# Patient Record
Sex: FEMALE | Race: WHITE | NOT HISPANIC OR LATINO | Employment: FULL TIME | URBAN - METROPOLITAN AREA
[De-identification: names, ages, dates, MRNs, and addresses within clinical notes are randomized per-mention and may not be internally consistent; named-entity substitution may affect disease eponyms.]

---

## 2023-04-04 LAB
ABO GROUP (TYPE) IN BLOOD: NORMAL
ANTIBODY SCREEN: NORMAL
CHLAMYDIA TRACH., AMPLIFIED: NORMAL
HEPATITIS B VIRUS SURFACE AG PRESENCE IN SERUM: NORMAL
HEPATITIS C VIRUS AB PRESENCE IN SERUM: NORMAL
HIV 1/ 2 AG/AB SCREEN: NORMAL
N. GONORRHEA, AMPLIFIED: NORMAL
RH FACTOR: NORMAL
RUBELLA VIRUS IGG AB: NORMAL
SYPHILIS TOTAL AB: NORMAL
VARICELLA ZOSTER IGG: NORMAL

## 2023-04-05 LAB
CHLAMYDIA TRACH., AMPLIFIED: NEGATIVE
N. GONORRHEA, AMPLIFIED: NEGATIVE

## 2023-10-03 ENCOUNTER — ANCILLARY PROCEDURE (OUTPATIENT)
Dept: ENDOCRINOLOGY | Facility: CLINIC | Age: 38
End: 2023-10-03
Payer: COMMERCIAL

## 2023-10-03 ENCOUNTER — LAB (OUTPATIENT)
Dept: LAB | Facility: LAB | Age: 38
End: 2023-10-03
Payer: COMMERCIAL

## 2023-10-03 DIAGNOSIS — Z11.59 ENCOUNTER FOR SCREENING FOR OTHER VIRAL DISEASES: Primary | ICD-10-CM

## 2023-10-03 DIAGNOSIS — Z01.83 ENCOUNTER FOR BLOOD TYPING: ICD-10-CM

## 2023-10-03 DIAGNOSIS — Z11.3 ENCOUNTER FOR SCREENING FOR INFECTIONS WITH A PREDOMINANTLY SEXUAL MODE OF TRANSMISSION: ICD-10-CM

## 2023-10-03 DIAGNOSIS — N83.8 OTHER NONINFLAMMATORY DISORDERS OF OVARY, FALLOPIAN TUBE AND BROAD LIGAMENT: ICD-10-CM

## 2023-10-03 LAB
ABO GROUP (TYPE) IN BLOOD: NORMAL
ANTIBODY SCREEN: NORMAL
RH FACTOR (ANTIGEN D): NORMAL

## 2023-10-03 PROCEDURE — 76830 TRANSVAGINAL US NON-OB: CPT

## 2023-10-03 PROCEDURE — 76856 US EXAM PELVIC COMPLETE: CPT | Performed by: OBSTETRICS & GYNECOLOGY

## 2023-10-03 PROCEDURE — 36415 COLL VENOUS BLD VENIPUNCTURE: CPT

## 2023-10-03 PROCEDURE — 76830 TRANSVAGINAL US NON-OB: CPT | Performed by: OBSTETRICS & GYNECOLOGY

## 2023-10-04 PROBLEM — N83.209 SIMPLE OVARIAN CYST: Status: ACTIVE | Noted: 2023-10-04

## 2023-10-04 PROBLEM — M62.838 MUSCLE SPASM: Status: ACTIVE | Noted: 2021-08-27

## 2023-10-04 PROBLEM — N92.0 MENORRHAGIA WITH REGULAR CYCLE: Status: ACTIVE | Noted: 2023-10-04

## 2023-10-04 PROBLEM — N70.11 CHRONIC SALPINGITIS: Status: ACTIVE | Noted: 2023-10-04

## 2023-10-04 PROBLEM — N83.8 PARATUBAL CYST: Status: ACTIVE | Noted: 2023-10-04

## 2023-10-04 PROBLEM — N97.9 FEMALE FERTILITY PROBLEM: Status: ACTIVE | Noted: 2023-10-04

## 2023-10-04 PROBLEM — E55.9 VITAMIN D DEFICIENCY: Status: ACTIVE | Noted: 2023-10-04

## 2023-10-04 LAB
HBV SURFACE AG SERPL QL IA: NONREACTIVE
HCV AB SER QL: NONREACTIVE
RUBV IGG SERPL IA-ACNC: 1.1 IA
RUBV IGG SERPL QL IA: POSITIVE
T PALLIDUM AB SER QL: NONREACTIVE
VARICELLA ZOSTER IGG INDEX: 0.9 IA
VZV IGG SER QL IA: ABNORMAL

## 2023-10-04 RX ORDER — ASPIRIN 325 MG
1 TABLET, DELAYED RELEASE (ENTERIC COATED) ORAL
COMMUNITY
Start: 2022-04-25 | End: 2023-10-09 | Stop reason: ALTCHOICE

## 2023-10-04 RX ORDER — AZITHROMYCIN 250 MG/1
TABLET, FILM COATED ORAL
COMMUNITY
Start: 2023-06-17 | End: 2023-10-09 | Stop reason: ALTCHOICE

## 2023-10-04 RX ORDER — B-COMPLEX WITH VITAMIN C
1 TABLET ORAL DAILY
COMMUNITY
Start: 2022-04-22 | End: 2023-10-09 | Stop reason: ALTCHOICE

## 2023-10-05 ENCOUNTER — TELEMEDICINE (OUTPATIENT)
Dept: BEHAVIORAL HEALTH | Facility: CLINIC | Age: 38
End: 2023-10-05
Payer: COMMERCIAL

## 2023-10-05 ENCOUNTER — TELEPHONE (OUTPATIENT)
Dept: ENDOCRINOLOGY | Facility: CLINIC | Age: 38
End: 2023-10-05

## 2023-10-05 DIAGNOSIS — Z31.69 INFERTILITY COUNSELING: ICD-10-CM

## 2023-10-05 LAB — HIV 1+2 AB+HIV1 P24 AG SERPL QL IA: NONREACTIVE

## 2023-10-05 PROCEDURE — 90791 PSYCH DIAGNOSTIC EVALUATION: CPT | Mod: 95 | Performed by: PSYCHOLOGIST

## 2023-10-05 PROCEDURE — 90791 PSYCH DIAGNOSTIC EVALUATION: CPT | Performed by: PSYCHOLOGIST

## 2023-10-05 NOTE — PROGRESS NOTES
Start time 11 am  End time 11:45 am    Sony Joy MRN [id number] and Ashok Charles    I met with Sony Joy and Ashok Charles who are requesting IUI with the intention to co-parent.    Relevant History  Sony, 38, is a single woman who has not had any longterm relationships and is content as single but would like to co-parent a child with her friend, Ashok Charles.  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  She and Ashok have been talking about co-parenting a child with her being inseminated with his sperm for 2-3 years and have tried on their own for 1 year with no pregnancy.    Ashok is a  at [employer] and is on the tenure track. He is also from [place] and became friends with Sony in high school and moved back to [place] 5 years ago.  Ashok recently  this summer and he and his  plan to adopt but the decision to co-parent (separately from having a child with his own ) was made before he met his .  Ashok says his  is very supportive of this plan.    Sony and Ashok state that while the intention is to co-parent, the plan that they have developed for themselves is that Sony will be the primary parent with primary residence.  It is unclear to me if they have any legal documentation about this plan. I explained that there is an ethical dilemma with a potential negative impact for offspring in this parenting situation with the expectation of a child having to live in 2 homes and the other potential complications that may arise.    We discussed the option of Sony having IUI as a single woman and Ashok would consent to be the identified sperm donor (along with relinquishing paternity rights) and suggested they discuss this and consult with a reproductive health . I also reviewed children's books that explain the concept of different constellations of families.   If they move forward with Ashok as an  identified donor then it is my opinion that Sony is an appropriate candidate for IUI using identified donor sperm.  If they do not choose this option they are aware that the fertility program will discuss their request at their next ethics meeting.    Calista Peres, PhD

## 2023-10-05 NOTE — TELEPHONE ENCOUNTER
"Returned patient's call, she states had appt with Dr Peres today and was \"blind sided\" as Dr. Peres states she will have complete additional clearance items prior to being allowed to proceed with fertility treatment. Advised patient I will forward her concerns to DE Zambrano NP for discussion at her fuv next week.  Reviewed and approved by LUCY PHELAN on 10/5/23 at 2:59 PM./  "

## 2023-10-05 NOTE — LETTER
October 5, 2023     Yvonne Lagos, KATHIA-CNP  [address]  [place] [id number]    Patient: Sony Joy   YOB: 1985   Date of Visit: 10/5/2023       Dear Dr. Yvonne Lagos, APRN-CNP:    Thank you for referring Sony Joy to me for evaluation. Below are my notes for this consultation.  If you have questions, please do not hesitate to call me. I look forward to following your patient along with you.       Sincerely,     Calista Peres, PhD      CC: Hellen Zambrano, APRN-MD Dorothy Claros MD Erika L Kelley, PhD  MD Dorothy Pappas, RN  ______________________________________________________________________________________    Sony Joy MRN [id number] and Ashok Charles    I met with Sony Joy and Ashok Charles who are requesting IUI with the intention to co-parent.    Relevant History  Sony, 38, is a single woman who has not had any longterm relationships and is content as single but would like to co-parent a child with her friend, Ashok Trujilloover.  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  She and Ashok have been talking about co-parenting a child with her being inseminated with his sperm for 2-3 years and have tried on their own for 1 year with no pregnancy.    Ashok is a  at [employer] and is on the tenure track. He is also from [place] and became friends with Sony in high school and moved back to [place] 5 years ago.  Ashok recently  this summer and he and his  plan to adopt but the decision to co-parent (separately from having a child with his own ) was made before he met his .  Ashok says his  is very supportive of this plan.    Sony and Ashok state that while the intention is to co-parent, the plan that they have developed for themselves is that Sony will be the primary parent with primary residence.  It is unclear to me if they have  any legal documentation about this plan. I explained that there is an ethical dilemma with a potential negative impact for offspring in this parenting situation with the expectation of a child having to live in 2 homes and the other potential complications that may arise.    We discussed the option of Sony having IUI as a single woman and Ashok would consent to be the identified sperm donor (along with relinquishing paternity rights) and suggested they discuss this and consult with a reproductive health . I also reviewed children's books that explain the concept of different constellations of families.   If they move forward with Ashok as an identified donor then it is my opinion that Sony is an appropriate candidate for IUI using identified donor sperm.  If they do not choose this option they are aware that the fertility program will discuss their request at their next ethics meeting.    Calista Peres, PhD

## 2023-10-09 ENCOUNTER — TELEMEDICINE (OUTPATIENT)
Dept: ENDOCRINOLOGY | Facility: CLINIC | Age: 38
End: 2023-10-09
Payer: COMMERCIAL

## 2023-10-09 DIAGNOSIS — N76.0 ACUTE VAGINITIS: Primary | ICD-10-CM

## 2023-10-09 DIAGNOSIS — Z31.89 ENCOUNTER FOR FERTILITY PLANNING: Primary | ICD-10-CM

## 2023-10-09 PROCEDURE — 99213 OFFICE O/P EST LOW 20 MIN: CPT | Mod: GT

## 2023-10-09 PROCEDURE — 99213 OFFICE O/P EST LOW 20 MIN: CPT

## 2023-10-09 RX ORDER — DOXYCYCLINE 100 MG/1
100 CAPSULE ORAL 2 TIMES DAILY
Qty: 14 CAPSULE | Refills: 0 | Status: SHIPPED | OUTPATIENT
Start: 2023-10-09 | End: 2023-10-16

## 2023-10-09 NOTE — PROGRESS NOTES
Follow Up Visit HPI  Patient is a 37 year old  female who presents for a FUV to discuss test results and next steps, with h/o primary infertility & 8-9 unsuccessful at home IUIs with a high school friend for the past year, recently met with Dr. Peres to discuss next steps. Identifies as female, Partner identifies as homosexual male.  Patient is not in relationship, she is attempting pregnancy with a high school friend, they are using artificial insemination at home and not having an intercourse- about 8-9 at home IUIs  Patient and partner are interested in co-parenting.  Friend/partner recently  summer 2023 and plans to adopt child with his .   Dr. Peres discussed option of partner being identified as known sperm donor with legal contracts.  Patient and partner unsure of how they will proceed.    Testing to date:   Type and Screen: A+  Rubella:  10/3/2023- immune  Varicella:  10/3/2023- equivocal- declines vaccine  TSH: 2.12  AMH: 4.79  STDs negative  Myriad declined  Hysterosalpingogram: normal 2023  GYN Pelvic Ultrasound: US PELVIS TRANSVAGINAL (10/3/2023):  repeat S on 10-3-23: left paraovarian simple cyst unchanged at 11.5 mm and no need to repeat US further per DR. 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   Periods are somewhat regular, on average cycles are 29, 5-7 days heavy, two heavy days, uses menstrual  cups for the heaviest days, had to change the cup twice to three times, during those days,(if uses super tampons has to change them every 45 min). The capacity of the cup is 30ml. Base body temperature indicated ovulation and lasts about 11 days on average. moderate to severe cramps x 1 day per period.    Past Medical History:   Diagnosis Date    Encounter for gynecological examination (general) (routine) without abnormal findings 2022    Well woman exam    Encounter for screening for malignant neoplasm of cervix 2022    Cervical cancer screening    Other conditions influencing health status 2022    Ambivalent attitude about trying to conceive    Vitamin D deficiency, unspecified 2022    Vitamin D deficiency     No past surgical history on file.  Current Outpatient Medications on File Prior to Visit   Medication Sig Dispense Refill    azithromycin (Zithromax) 250 mg tablet Use as directed per package instructions      cholecalciferol (Vitamin D-3) 1,250 mcg (50,000 unit) capsule Take 1 capsule (50,000 Units) by mouth 1 (one) time per week.      norethindrone-e.estradioL-iron (Loestrin 24 FE) 1 mg-20 mcg (24)/75 mg (4) tablet Take by mouth. As directed.      prenatal vitamin, iron-folic, (Prenatal Vitamin) 27 mg iron-800 mcg folic acid tablet Take 1 tablet by mouth once daily.       No current facility-administered medications on file prior to visit.       BMI:   BMI Readings from Last 1 Encounters:   23 25.58 kg/m²     VITALS:  There were no vitals taken for this visit.  LMP: No LMP recorded.    ASSESSMENT   38 y.o. female  with h/o male factor infertility, infertility x 1 years, s/p 8-9 unsuccessful at home IUIs with a high school friend (Ashok) for the past year, recently met with Dr. Peres for psychological assessment to discuss next steps in Donor IUI process. Identifies as female, partner identifies as homosexual male. Patient and partner are interested in co-parenting,  friend/partner recently  and plans to adopt child. Dr. Peres discussed option of partner being identified as known sperm donor vs to go to ethics if they do not choose this option.      FOLLOW UP PLAN  -if chooses to co-parent, then Ethics must decide if this is a viable option- next meeting 11-1-2023  -will need legal contracts regardless of option chosen- both must have separate   -if approved by Ethics, then DR. Peres will need to meet with Ashok's   -if approved by Ethics, Ashok will need Viromed STDs/FDA Physical/ FDA questionnaire/SA- will need to meet with GABBY Lagos CNP (CNP Coordinator for Third Party Reproduction) to establish care- should schedule a visit for after the next ethics meeting so as not to delay care      10/09/23 at 6:17 PM - KATHIA Brady-EMMY

## 2023-10-10 ENCOUNTER — APPOINTMENT (OUTPATIENT)
Dept: ENDOCRINOLOGY | Facility: CLINIC | Age: 38
End: 2023-10-10
Payer: COMMERCIAL

## 2023-11-03 ENCOUNTER — TELEPHONE (OUTPATIENT)
Dept: ENDOCRINOLOGY | Facility: CLINIC | Age: 38
End: 2023-11-03
Payer: COMMERCIAL

## 2023-11-03 NOTE — TELEPHONE ENCOUNTER
My Chart message sent to patient indicating that Dr. Martinez will be following up with her after the ethics meeting and to anticipate a call in the next week or so.  Molina Tiwari RN

## 2023-11-08 ENCOUNTER — TELEPHONE (OUTPATIENT)
Dept: ENDOCRINOLOGY | Facility: CLINIC | Age: 38
End: 2023-11-08
Payer: COMMERCIAL

## 2023-11-08 ENCOUNTER — TELEPHONE (OUTPATIENT)
Dept: ENDOCRINOLOGY | Facility: CLINIC | Age: 38
End: 2023-11-08

## 2023-11-08 NOTE — TELEPHONE ENCOUNTER
Pt requesting you call her today, she said she has been playing phone tag and today would be a good day to be able to speak with you.

## 2023-11-08 NOTE — TELEPHONE ENCOUNTER
Returned call to patient. Did explain that Yvonne Lagos is out of the office rest of this week. Did explain that she would like to see her for a virtual follow up visit to discuss next steps and get all of her questions answered regarding the process of using a known sperm donor.   Explained that Yvonne did have an appointment with known donor this week and explained next steps. Sony did express that he still has questions and may be some confusion regarding next steps. Told patient I will talk to Yvonne and see if we can get her in for a follow up as soon as possible to discuss more and then will provide a detailed list of exactly what is needed to proceed with this process. Patient agreeable. Will contact once we have a date for visit.    11/08/23 at 4:00 PM - Dorothy Barrera RN

## 2023-11-13 ENCOUNTER — TELEMEDICINE (OUTPATIENT)
Dept: ENDOCRINOLOGY | Facility: CLINIC | Age: 38
End: 2023-11-13
Payer: COMMERCIAL

## 2023-11-13 VITALS — BODY MASS INDEX: 24.55 KG/M2 | HEIGHT: 61 IN | WEIGHT: 130 LBS

## 2023-11-13 DIAGNOSIS — Z13.29 SCREENING FOR THYROID DISORDER: ICD-10-CM

## 2023-11-13 DIAGNOSIS — Z11.59 SCREENING FOR VIRAL DISEASE: Primary | ICD-10-CM

## 2023-11-13 DIAGNOSIS — Z31.89 ENCOUNTER FOR FERTILITY PLANNING: ICD-10-CM

## 2023-11-13 PROCEDURE — 99214 OFFICE O/P EST MOD 30 MIN: CPT | Performed by: NURSE PRACTITIONER

## 2023-11-13 ASSESSMENT — PAIN SCALES - GENERAL: PAINLEVEL: 0-NO PAIN

## 2023-11-13 NOTE — PROGRESS NOTES
Virtual Visit    Follow Up Visit HPI    Patient is a 38 y.o.  female with Unexplained Infertility presenting today for follow up visit.     Testing to date:   Result Date Done   Type and Screen: A 10/3/2023   Rh: POS 10/3/2023   Antibody: NEG (Ref range: )  No results found for requested labs within last 365 days.   Rubella: Positive (A; Ref range: Negative) 10/3/2023   Varicella: Equivocal (A; Ref range: Negative) 10/3/2023   TSH: No results found for requested labs within last 365 days. No results found for requested labs within last 365 days.   AMH: No results found for requested labs within last 365 days. No results found for requested labs within last 365 days.   PRL: No results found for requested labs within last 365 days. No results found for requested labs within last 365 days.   Testosterone: No results found for requested labs within last 365 days. No results found for requested labs within last 365 days.   DHEAS: No results found for requested labs within last 365 days. No results found for requested labs within last 365 days.   FSH: No results found for requested labs within last 365 days. No results found for requested labs within last 365 days.   17 OHP: No results found for requested labs within last 365 days. No results found for requested labs within last 365 days.   Hepatitis B surface antigen: Nonreactive (Ref range: Nonreactive) 10/3/2023   Hepatitis C antibody: Nonreactive (Ref range: Nonreactive) 10/3/2023   HIV ½ Antigen Antibody screen with reflex: Nonreactive (Ref range: Nonreactive) 10/3/2023   Syphilis screening with reflex: No results found for requested labs within last 365 days. 10/3/2023   Other: Pt reports she had Gc/Chl urine sample done at [facility] 10/2023 and result was Negative   CMV: not yet done    Hysterosalpingogram: FL HYSTEROSALPINGOGRAM (2023):   Normal uterine contour   Right tube with fill and spill with normal architecture, left tube with fill and spill with  some distal dilation     Saline Infused Sonography: N/A    GYN Pelvic Ultrasound: US PELVIS TRANSVAGINAL (10/3/2023):   Impression  Cyst noted in left ovary - possible corpus luteum, free fluid noted in cul-de-sac    Follow-up with ordering Provider  Uterus:     Visualized  Uterus position:  retroverted  Description of uterine malformations:     arcuate uterus  Myometrium: heterogenous  Endometrium:      uniform echogenicity: isoechogenic  Cervix details:   normal  Uterus length     67.5 mm  Uterus width      47.1 mm  Uterus height     37.9 mm  Endometrial thickness, total  8.1 mm    Right Ovary  Rt ovary:   Visualized  Rt ovary morphology:    normal  Rt ovary D1 35.5 mm  Rt ovary D2 29.7 mm  Rt ovary D3 17.9 mm  Rt ovary Vol      9.9 cm  Rt ovarian follicle(s): Follicles identified  Rt ovarian follicles other findings:      Antral Follicles 9 < 10 mm    Left Ovary  Lt ovary:   Visualized  Lt ovary morphology:    normal  Lt ovary D1 46.7 mm  Lt ovary D2 31.1 mm  Lt ovary D3 25.0 mm  Lt ovary Vol      19.0 cm  Lt ovarian cyst(s):     Cysts identified  Lt ovarian cyst D1      12 mm  Lt ovarian cyst D2      11 mm  Lt ovarian cyst mean    11.5 mm  Lt ovarian cysts other findings:    Para ovarian simple  Lt ovarian follicle(s): Follicles identified  Lt ovarian follicles other findings:      Antral Follicles 10 < 10 mm    Cul de Sac  Visualized. Free fluid visualized  Largest pool 24.7 mm x 26.0 mm x 17.8 mm. Vol 5.985 ml  Method    Directed Donor SA (Ashok Charles  88): normal     Treatment to date: Pt reports she has be actively trying to conceive with friend Ashok for 17 months. Pt reports she does at home insemination 2-3 times per week during her fertile window and uses OPKs which patient reports are accurate for her. No conception to date.     Past Medical History:   Diagnosis Date    Encounter for gynecological examination (general) (routine) without abnormal findings 2022    Well woman exam    Encounter  "for screening for malignant neoplasm of cervix 2022    Cervical cancer screening    Other conditions influencing health status 2022    Ambivalent attitude about trying to conceive    Vitamin D deficiency, unspecified 2022    Vitamin D deficiency     History reviewed. No pertinent surgical history.  No current outpatient medications on file prior to visit.     No current facility-administered medications on file prior to visit.       BMI:   BMI Readings from Last 1 Encounters:   23 24.56 kg/m²     VITALS:  Ht 1.549 m (5' 1\")   Wt 59 kg (130 lb)   LMP 2023   BMI 24.56 kg/m²   LMP: Patient's last menstrual period was 2023.    ASSESSMENT   38 y.o.  female with primary infertility x 17 months, Unexplained Infertility / female infertility associated with male factors (using directed sperm donor plans to co-parent).   AMH 4.79  Regular monthly menstrual cycles with moderate dysmenorrhea.     COUNSELING  We discussed options; diagnostic laparoscopy vs ovulation induction with at home inseminations, vs ovulation induction with directed donor IUI (FDA eligibility would need to be completed on directed donor).     We discussed using Ovulation Predictor Kits to time fertility treatments.   Patient aware we recommend Clear Blue Easy Digital OPK (not advanced) however there are several choices for OPK on the market and any one that she chooses is fine to use.     We discussed that she should begin testing on cycle day 10 (Day 1 is first day of full flow menses).   We discussed that she is to use the second urine of the morning and call the office with + OPK prior to 4 pm that day if planning IUI.  If not planning IUI, patient advised to have intercourse day of + OPK.  We discussed that Clomid is used for ovulation induction. We discussed common side effects of Clomid including headaches, vision changes, hot flashes, mood changes, and breast tenderness.  We discussed that there is an " increased risk of multiple gestation with Clomid approximately 10% for twins and less than 1% for triplets.  Counseled regarding option of selective reduction for higher order multiples.    Routine Testing  Fertility Center  STDs Within 1 year   Genetic carrier Waiver/Completed   T&S Within 1 year   AMH Within 1 year   TSH Within 1 year   Rubella/Varicella Within 5 years     BMI Testing  Fertility Center  CBC Within 1 year   CMP Within 1 year   HgbA1c Within 1 year   Mag, Phos, Vit D <18 Within 1 year   MFM > 40  REQ   Wt loss consult > 40 OPT     PLAN  Orders Placed This Encounter   Procedures    CMV IgG, IgM       FOLLOW UP   Consults: Financial consult  Engaged MD  Take prenatal vitamins, vitamin D 2000 IUs daily  Discussed that treatment cannot proceed until checklist items are complete.   Patient to schedule follow up visit within one month. Advised patient to arrange this now with the .  Chart to primary nurse for care coordination and patient check list/education. Primary RN Dorothy Barrera.   CMV IgG IgM ordered today  Repeat TSH  Directed donor to freeze sperm (recommend 3 collections/freezes within 7 day time frame of viromed testing)  Directed donor will need FDA eligibility  Pt will need to sign FDA waiver once eligibility has been completed  Legal Contracts with directed sperm donor  Consult with Dr. Martinez if pt is interested in discussing diagnostic laparoscopy  Pt may consider clomid with at home inseminations prior to OI partner IUI       Yvonne Lagos  11/13/2023  3:48 PM

## 2023-11-21 ENCOUNTER — PREP FOR PROCEDURE (OUTPATIENT)
Dept: ENDOCRINOLOGY | Facility: CLINIC | Age: 38
End: 2023-11-21
Payer: COMMERCIAL

## 2023-11-21 ENCOUNTER — TELEPHONE (OUTPATIENT)
Dept: ENDOCRINOLOGY | Facility: CLINIC | Age: 38
End: 2023-11-21
Payer: COMMERCIAL

## 2023-11-21 DIAGNOSIS — G89.29 CHRONIC PELVIC PAIN IN FEMALE: Primary | ICD-10-CM

## 2023-11-21 DIAGNOSIS — N93.9 ABNORMAL UTERINE BLEEDING (AUB): ICD-10-CM

## 2023-11-21 DIAGNOSIS — R10.2 CHRONIC PELVIC PAIN IN FEMALE: Primary | ICD-10-CM

## 2023-11-21 DIAGNOSIS — Z01.818 PRE-PROCEDURAL EXAMINATION: ICD-10-CM

## 2023-11-21 RX ORDER — CELECOXIB 50 MG/1
400 CAPSULE ORAL ONCE
Status: CANCELLED | OUTPATIENT
Start: 2023-11-21 | End: 2023-11-21

## 2023-11-21 RX ORDER — ACETAMINOPHEN 325 MG/1
975 TABLET ORAL ONCE
Status: CANCELLED | OUTPATIENT
Start: 2023-11-21 | End: 2023-11-21

## 2023-11-21 RX ORDER — GABAPENTIN 600 MG/1
600 TABLET ORAL ONCE
Status: CANCELLED | OUTPATIENT
Start: 2023-11-21 | End: 2023-11-21

## 2023-11-21 NOTE — TELEPHONE ENCOUNTER
Called Sony with Yvonne Lagos to answer additional questions. Sony expressed frustration over the process of IUI-D at [facility]. Reviewed directed donation as well as FDA requirements, designations, ineligibility standards and possible options.   Discussed the following:  - Surgery for possible endometriosis  - Clomid and home inseminations  - Process for IUI-D with Ashok's sperm  - Request exemption through FDA  - Seeking care at another facility    Reviewed that although they established with [facility] in March, they did not see Dr Peres until August.  Yvonne will send a MC with their list of items for completion and Sony would also like to proceed with surgical evaluation for endometriosis. I will put in case request and set up a preoperative visit.   All questions answered.   Dorothy Martinez MD

## 2023-11-22 NOTE — PROGRESS NOTES
Boarding Pass Oral/Injectible with TIC/IUI Checklist  Age: 38 y.o.    Provider: EMMY Mattson MD  Primary RN: Dorothy Barrera  Reasons for Treatment: Unexplained Infertility,  Last BMI  23 : 25.41 kg/m²       Past Medical History:   Diagnosis Date    Encounter for gynecological examination (general) (routine) without abnormal findings 2022    Well woman exam    Encounter for screening for malignant neoplasm of cervix 2022    Cervical cancer screening    Other conditions influencing health status 2022    Ambivalent attitude about trying to conceive    Vitamin D deficiency, unspecified 2022    Vitamin D deficiency     Ectopic Risk: N     Date Done Consultation Results/Comments    Medication Protocol Clomid 100mg CD 5-9 / TIC (if not preg this month, can do CD 3-7 for an additional 2 months)    Procedure Order Placed N/A   10/5/23 Psych Okay to proceed? Yes- if she proceeds with using known donor    Genetic Carrier Screening Clearance Declined genetics   N/A MFM Consult Okay to proceed? N/A   N/A Genetics Consult Okay to proceed? N/A    Other    Date Done Female Labs Results/Comments   2023 T&S (Q 1 Year) ABO: A  Rh: POS  Antibody: NEG   10/3/2023 Hep B sAg Nonreactive   10/3/2023 Hep C AB Nonreactive   10/3/2023 HIV Nonreactive   10/3/2023 Syphilis Nonreactive   10/11/23  (Scanned) GC/CT GC: NEGATIVE  CT: NEGATIVE   2023 CMV Nonreactive; <8.0   10/3/2023 Rubella (Q 5 Years) Positive     10/3/2023 Varicella (Q 5 Years) Equivocal  Declined vaccine- per Hellen Zambrano note 10/9/23   2023 TSH 1.58 (Ref range: 0.44 - 3.98 mIU/L)    HgbA1C Not ordered- last 22= 5.0%   2022 AMH 4.79   N/A Carrier Screening Myriad 2bP: N/A  Declined   23 Tubal Eval/Uterine Cavity Eval HSG: FL HYSTEROSALPINGOGRAM (2023): right tube with fill and spill with normal architecture, left tube with fill and spill with some distal dilation   PELVIC: Cyst noted in  left ovary - possible corpus luteum, free fluid noted in cul-de-sac  (10/2/23)  SIS: N/A  Hyster: (N/A)    12/6: laparoscopy for Endo; no endo found, peritoneal biopsies taken   4/22/2022 Pap Smear (Q 5 Years) Lab Results   Component Value Date    CVTFINALDX  04/22/2022     A.  THINPREP PAP CERVICAL:              Specimen adequacy:       SATISFACTORY FOR EVALUATION.       Quality Indicator: Endocervical/transformation zone component is present.              General Categorization:       NEGATIVE FOR INTRAEPITHELIAL LESION OR MALIGNANCY.                            HIGH RISK HPV TEST RESULT:                       HPV GENOTYPE  16                      NEGATIVE       HPV GENOTYPE  18                      NEGATIVE       HPV GENOTYPE  OTHER             NEGATIVE              Reference Range: Negative                     HPV: No results found for requested labs within last 1825 days.   N/A Mammogram ( > 40) (Q 1 Year) N/A               Date Done Male Labs Results/Comments    **N/A-     Date Done Miscellaneous Results/Comments    FDA Waiver Signed and in chart (if ineligible known donor): N/A    BMI Checklist  BMI > 40 or < 18 Added to chart:   No    >= 45 Checklist  Added to chart:   No     Patient aware that to do treatments with known donor at [facility] she would need to follow established third party processes.  She will let us know if/when she wishes to proceed.   Reviewed and approved by NEIL ENCISO on 12/12/23 at 2:24 PM.

## 2023-11-30 PROBLEM — G89.29 CHRONIC PELVIC PAIN IN FEMALE: Status: ACTIVE | Noted: 2023-11-21

## 2023-11-30 PROBLEM — R10.2 CHRONIC PELVIC PAIN IN FEMALE: Status: ACTIVE | Noted: 2023-11-21

## 2023-11-30 PROBLEM — N93.9 ABNORMAL UTERINE BLEEDING (AUB): Status: ACTIVE | Noted: 2023-11-21

## 2023-12-05 ENCOUNTER — PREP FOR PROCEDURE (OUTPATIENT)
Dept: ENDOCRINOLOGY | Facility: CLINIC | Age: 38
End: 2023-12-05

## 2023-12-05 ENCOUNTER — LAB (OUTPATIENT)
Dept: LAB | Facility: LAB | Age: 38
End: 2023-12-05
Payer: COMMERCIAL

## 2023-12-05 ENCOUNTER — TELEMEDICINE (OUTPATIENT)
Dept: ENDOCRINOLOGY | Facility: CLINIC | Age: 38
End: 2023-12-05
Payer: COMMERCIAL

## 2023-12-05 DIAGNOSIS — Z11.59 SCREENING FOR VIRAL DISEASE: ICD-10-CM

## 2023-12-05 DIAGNOSIS — R10.2 CHRONIC PELVIC PAIN IN FEMALE: Primary | ICD-10-CM

## 2023-12-05 DIAGNOSIS — Z01.818 PRE-PROCEDURAL EXAMINATION: ICD-10-CM

## 2023-12-05 DIAGNOSIS — Z13.29 SCREENING FOR THYROID DISORDER: ICD-10-CM

## 2023-12-05 DIAGNOSIS — N80.9 ENDOMETRIOSIS: ICD-10-CM

## 2023-12-05 DIAGNOSIS — G89.29 CHRONIC PELVIC PAIN IN FEMALE: Primary | ICD-10-CM

## 2023-12-05 LAB
ABO GROUP (TYPE) IN BLOOD: NORMAL
ANION GAP SERPL CALC-SCNC: 13 MMOL/L (ref 10–20)
ANTIBODY SCREEN: NORMAL
BUN SERPL-MCNC: 10 MG/DL (ref 6–23)
CALCIUM SERPL-MCNC: 8.8 MG/DL (ref 8.6–10.3)
CHLORIDE SERPL-SCNC: 104 MMOL/L (ref 98–107)
CO2 SERPL-SCNC: 26 MMOL/L (ref 21–32)
CREAT SERPL-MCNC: 0.67 MG/DL (ref 0.5–1.05)
ERYTHROCYTE [DISTWIDTH] IN BLOOD BY AUTOMATED COUNT: 12.4 % (ref 11.5–14.5)
GFR SERPL CREATININE-BSD FRML MDRD: >90 ML/MIN/1.73M*2
GLUCOSE SERPL-MCNC: 76 MG/DL (ref 74–99)
HCG UR QL IA.RAPID: NEGATIVE
HCT VFR BLD AUTO: 36.8 % (ref 36–46)
HGB BLD-MCNC: 12.2 G/DL (ref 12–16)
MCH RBC QN AUTO: 29 PG (ref 26–34)
MCHC RBC AUTO-ENTMCNC: 33.2 G/DL (ref 32–36)
MCV RBC AUTO: 88 FL (ref 80–100)
NRBC BLD-RTO: 0 /100 WBCS (ref 0–0)
PLATELET # BLD AUTO: 351 X10*3/UL (ref 150–450)
POTASSIUM SERPL-SCNC: 3.7 MMOL/L (ref 3.5–5.3)
RBC # BLD AUTO: 4.2 X10*6/UL (ref 4–5.2)
RH FACTOR (ANTIGEN D): NORMAL
SODIUM SERPL-SCNC: 139 MMOL/L (ref 136–145)
TSH SERPL-ACNC: 1.58 MIU/L (ref 0.44–3.98)
WBC # BLD AUTO: 8 X10*3/UL (ref 4.4–11.3)

## 2023-12-05 PROCEDURE — 36415 COLL VENOUS BLD VENIPUNCTURE: CPT

## 2023-12-05 PROCEDURE — 86850 RBC ANTIBODY SCREEN: CPT

## 2023-12-05 PROCEDURE — 80048 BASIC METABOLIC PNL TOTAL CA: CPT

## 2023-12-05 PROCEDURE — 85027 COMPLETE CBC AUTOMATED: CPT

## 2023-12-05 PROCEDURE — 86645 CMV ANTIBODY IGM: CPT

## 2023-12-05 PROCEDURE — PREOR: Performed by: OBSTETRICS & GYNECOLOGY

## 2023-12-05 PROCEDURE — 86644 CMV ANTIBODY: CPT

## 2023-12-05 PROCEDURE — 86900 BLOOD TYPING SEROLOGIC ABO: CPT

## 2023-12-05 PROCEDURE — 84443 ASSAY THYROID STIM HORMONE: CPT

## 2023-12-05 PROCEDURE — 81025 URINE PREGNANCY TEST: CPT

## 2023-12-05 PROCEDURE — 86901 BLOOD TYPING SEROLOGIC RH(D): CPT

## 2023-12-05 ASSESSMENT — PAIN SCALES - GENERAL: PAINLEVEL: 0-NO PAIN

## 2023-12-05 NOTE — PROGRESS NOTES
Virtual Visit    Pre Op Visit     GEOVANNY Cardoza is a 39 yo  female who presents today for a preoperative appointment .  She desires to proceed with laparoscopy for evaluation and treatment of chronic pelvic pain, possible endometriosis that is resistant to conservative management.  Risks, benefits, and details of laparoscopy, possible lysis of adhesions/excision of endometriosis, possible neosalpingostomy versus salpingectomy were reviewed with patient, and all questions were answered to her satisfaction.  She understands the risks of the procedure include, but not limited to: Pain, bleeding potential need for blood transfusion, injury to organs and surrounding field necessitating additional surgery, risk of reaction to medications/anesthesia, blood clots, heart attack, stroke, and death.  She also understands that fallopian tubes may not be able to be repaired, and salpingectomy may be recommended.  The patient reports that she does not want to have fallopian tubes removed with this procedure.  All questions were answered to the patient's satisfaction.      Component      Latest Ref Rng 12/5/2023   WBC      4.4 - 11.3 x10*3/uL 8.0    nRBC      0.0 - 0.0 /100 WBCs 0.0    RBC      4.00 - 5.20 x10*6/uL 4.20    HEMOGLOBIN      12.0 - 16.0 g/dL 12.2    HEMATOCRIT      36.0 - 46.0 % 36.8    MCV      80 - 100 fL 88    MCH      26.0 - 34.0 pg 29.0    MCHC      32.0 - 36.0 g/dL 33.2    RED CELL DISTRIBUTION WIDTH      11.5 - 14.5 % 12.4    Platelets      150 - 450 x10*3/uL 351    ABO TYPE A    Rh Type POS    ANTIBODY SCREEN NEG        Past Medical History:   Diagnosis Date    Encounter for gynecological examination (general) (routine) without abnormal findings 04/22/2022    Well woman exam    Encounter for screening for malignant neoplasm of cervix 04/22/2022    Cervical cancer screening    Other conditions influencing health status 04/22/2022    Ambivalent attitude about trying to conceive    Vitamin D deficiency,  unspecified 04/25/2022    Vitamin D deficiency     History reviewed. No pertinent surgical history.  No current outpatient medications on file prior to visit.     No current facility-administered medications on file prior to visit.       BMI:   BMI Readings from Last 1 Encounters:   11/13/23 24.56 kg/m²     VITALS:  LMP 11/11/2023 (Exact Date)   LMP: Patient's last menstrual period was 11/11/2023 (exact date).    This is a telehealth appointment    ASSESSMENT   Sony is a 39 yo  female who desires to proceed with laparoscopy, possible lysis of adhesions/excision of endometriosis, possible neosalpingostomy for pelvic pain, dysmenorrhea, possible endometriosis.  Risk, benefits, and details of the aforementioned procedure were discussed at length, and verbal informed consent was obtained today.  We will obtain formal written informed consent the day of surgery.    Plan:  [x ] we will submit a booking sheet to our offices surgical scheduler for laparoscopy, possible lysis of adhesions/excision of endometriosis, possible neosalpingostomy  [x ] patient is to remain n.p.o. beginning at midnight tonight for procedure  [x ] no aspirin ibuprofen products for 2 weeks prior to procedure  [x ] doxycycline 100 prophylactic antibiotics  [x ] hCG testing prior to surgery.    Emir Brown MD  Reproductive Endocrinology and Inf[facility]   [facility]   [phone] [place]   [phone] [place]  12/05/2023  4:05 PM

## 2023-12-05 NOTE — H&P
Surgical Pre-Op H&P    History Of Present Illness  Sony Joy is a 38 y.o. female presenting for scheduled surgery: laparoscopy, possible lysis of adhesions/excision of endometriosis, possible neosalpingostomy versus salpingectomy      The patient reports that she does not want to have fallopian tubes removed with this procedure.     Past Medical History  She has a past medical history of Encounter for gynecological examination (general) (routine) without abnormal findings (2022), Encounter for screening for malignant neoplasm of cervix (2022), Other conditions influencing health status (2022), and Vitamin D deficiency, unspecified (2022).    Surgical History  She has no past surgical history on file.     OB History  OB History    Para Term  AB Living   0 0 0 0 0 0   SAB IAB Ectopic Multiple Live Births   0 0 0 0 0       Review of Systems   All other systems reviewed and are negative.       Physical Exam  Vitals reviewed.   Constitutional:       Appearance: Normal appearance.   HENT:      Head: Normocephalic.   Cardiovascular:      Rate and Rhythm: Normal rate.   Pulmonary:      Effort: Pulmonary effort is normal.   Abdominal:      Palpations: Abdomen is soft.      Tenderness: There is no abdominal tenderness. There is no guarding or rebound.   Neurological:      Mental Status: She is alert and oriented to person, place, and time.          Last Recorded Vitals: to be done on admission    Relevant Results  Medications  No current outpatient medications on file.    Labs  CBC  WBC   Date Value Ref Range Status   2023 8.0 4.4 - 11.3 x10*3/uL Final     nRBC   Date Value Ref Range Status   2023 0.0 0.0 - 0.0 /100 WBCs Final     RBC   Date Value Ref Range Status   2023 4.20 4.00 - 5.20 x10*6/uL Final     Hemoglobin   Date Value Ref Range Status   2023 12.2 12.0 - 16.0 g/dL Final     Hematocrit   Date Value Ref Range Status   2023 36.8 36.0 - 46.0 %  "Final     MCV   Date Value Ref Range Status   12/05/2023 88 80 - 100 fL Final     MCH   Date Value Ref Range Status   12/05/2023 29.0 26.0 - 34.0 pg Final     MCHC   Date Value Ref Range Status   12/05/2023 33.2 32.0 - 36.0 g/dL Final     RDW   Date Value Ref Range Status   12/05/2023 12.4 11.5 - 14.5 % Final     Platelets   Date Value Ref Range Status   12/05/2023 351 150 - 450 x10*3/uL Final       CMP  Glucose   Date Value Ref Range Status   12/05/2023 76 74 - 99 mg/dL Final     Sodium   Date Value Ref Range Status   12/05/2023 139 136 - 145 mmol/L Final     Potassium   Date Value Ref Range Status   12/05/2023 3.7 3.5 - 5.3 mmol/L Final     Chloride   Date Value Ref Range Status   12/05/2023 104 98 - 107 mmol/L Final     Bicarbonate   Date Value Ref Range Status   12/05/2023 26 21 - 32 mmol/L Final     Anion Gap   Date Value Ref Range Status   12/05/2023 13 10 - 20 mmol/L Final     Urea Nitrogen   Date Value Ref Range Status   12/05/2023 10 6 - 23 mg/dL Final     Creatinine   Date Value Ref Range Status   12/05/2023 0.67 0.50 - 1.05 mg/dL Final     eGFR   Date Value Ref Range Status   12/05/2023 >90 >60 mL/min/1.73m*2 Final     Comment:     Calculations of estimated GFR are performed using the 2021 CKD-EPI Study Refit equation without the race variable for the IDMS-Traceable creatinine methods.  https://jasn.asnjournals.org/content/early/2021/09/22/ASN.4288152216     Calcium   Date Value Ref Range Status   12/05/2023 8.8 8.6 - 10.3 mg/dL Final       Coagulation   No results found for: \"PROTIME\", \"INR\", \"APTT\", \"FIBRINOGEN\"    Pregnancy Tests  HCG, Urine   Date Value Ref Range Status   12/05/2023 NEGATIVE NEGATIVE Final       Imaging   HSG:    Findings:           Uterus - normal contour without filling defects           Tubes- right tube with fill and spill with normal architecture, left tube with fill and spill with some distal dilation        Assessment/Plan   Sony Joy is a 38 y.o. female presenting for " scheduled surgery: laparoscopy, possible lysis of adhesions/excision of endometriosis, possible neosalpingostomy versus salpingectomy       Benita Huynh MD PGY 5  Reproductive Endocrinology and Infertility Fellow

## 2023-12-06 ENCOUNTER — HOSPITAL ENCOUNTER (OUTPATIENT)
Facility: HOSPITAL | Age: 38
Setting detail: OUTPATIENT SURGERY
Discharge: HOME | End: 2023-12-06
Attending: OBSTETRICS & GYNECOLOGY | Admitting: OBSTETRICS & GYNECOLOGY
Payer: COMMERCIAL

## 2023-12-06 ENCOUNTER — ANESTHESIA (OUTPATIENT)
Dept: OPERATING ROOM | Facility: HOSPITAL | Age: 38
End: 2023-12-06
Payer: COMMERCIAL

## 2023-12-06 ENCOUNTER — ANESTHESIA EVENT (OUTPATIENT)
Dept: OPERATING ROOM | Facility: HOSPITAL | Age: 38
End: 2023-12-06
Payer: COMMERCIAL

## 2023-12-06 VITALS
TEMPERATURE: 97.2 F | HEIGHT: 61 IN | DIASTOLIC BLOOD PRESSURE: 86 MMHG | SYSTOLIC BLOOD PRESSURE: 114 MMHG | RESPIRATION RATE: 13 BRPM | BODY MASS INDEX: 25.39 KG/M2 | HEART RATE: 76 BPM | OXYGEN SATURATION: 99 % | WEIGHT: 134.48 LBS

## 2023-12-06 DIAGNOSIS — Z98.890 POSTOPERATIVE STATE: Primary | ICD-10-CM

## 2023-12-06 DIAGNOSIS — G89.29 CHRONIC PELVIC PAIN IN FEMALE: ICD-10-CM

## 2023-12-06 DIAGNOSIS — R10.2 CHRONIC PELVIC PAIN IN FEMALE: ICD-10-CM

## 2023-12-06 DIAGNOSIS — N93.9 ABNORMAL UTERINE BLEEDING (AUB): ICD-10-CM

## 2023-12-06 LAB
CMV IGG AVIDITY SERPL IA-RTO: NONREACTIVE %
PREGNANCY TEST URINE, POC: NEGATIVE

## 2023-12-06 PROCEDURE — 3600000008 HC OR TIME - EACH INCREMENTAL 1 MINUTE - PROCEDURE LEVEL THREE: Performed by: OBSTETRICS & GYNECOLOGY

## 2023-12-06 PROCEDURE — 7100000002 HC RECOVERY ROOM TIME - EACH INCREMENTAL 1 MINUTE: Performed by: OBSTETRICS & GYNECOLOGY

## 2023-12-06 PROCEDURE — A58662 PR LAP,FULGURATE/EXCISE LESIONS: Performed by: ANESTHESIOLOGY

## 2023-12-06 PROCEDURE — 3700000002 HC GENERAL ANESTHESIA TIME - EACH INCREMENTAL 1 MINUTE: Performed by: OBSTETRICS & GYNECOLOGY

## 2023-12-06 PROCEDURE — 2500000005 HC RX 250 GENERAL PHARMACY W/O HCPCS: Performed by: NURSE ANESTHETIST, CERTIFIED REGISTERED

## 2023-12-06 PROCEDURE — 7100000010 HC PHASE TWO TIME - EACH INCREMENTAL 1 MINUTE: Performed by: OBSTETRICS & GYNECOLOGY

## 2023-12-06 PROCEDURE — A58662 PR LAP,FULGURATE/EXCISE LESIONS: Performed by: NURSE ANESTHETIST, CERTIFIED REGISTERED

## 2023-12-06 PROCEDURE — 2720000007 HC OR 272 NO HCPCS: Performed by: OBSTETRICS & GYNECOLOGY

## 2023-12-06 PROCEDURE — 3600000003 HC OR TIME - INITIAL BASE CHARGE - PROCEDURE LEVEL THREE: Performed by: OBSTETRICS & GYNECOLOGY

## 2023-12-06 PROCEDURE — A4217 STERILE WATER/SALINE, 500 ML: HCPCS | Performed by: OBSTETRICS & GYNECOLOGY

## 2023-12-06 PROCEDURE — 7100000001 HC RECOVERY ROOM TIME - INITIAL BASE CHARGE: Performed by: OBSTETRICS & GYNECOLOGY

## 2023-12-06 PROCEDURE — 2500000004 HC RX 250 GENERAL PHARMACY W/ HCPCS (ALT 636 FOR OP/ED): Performed by: NURSE ANESTHETIST, CERTIFIED REGISTERED

## 2023-12-06 PROCEDURE — 88305 TISSUE EXAM BY PATHOLOGIST: CPT | Mod: TC,SUR,AHULAB,WESLAB | Performed by: OBSTETRICS & GYNECOLOGY

## 2023-12-06 PROCEDURE — 58662 LAPAROSCOPY EXCISE LESIONS: CPT | Performed by: OBSTETRICS & GYNECOLOGY

## 2023-12-06 PROCEDURE — 88305 TISSUE EXAM BY PATHOLOGIST: CPT | Performed by: STUDENT IN AN ORGANIZED HEALTH CARE EDUCATION/TRAINING PROGRAM

## 2023-12-06 PROCEDURE — 2500000005 HC RX 250 GENERAL PHARMACY W/O HCPCS: Performed by: OBSTETRICS & GYNECOLOGY

## 2023-12-06 PROCEDURE — 7100000009 HC PHASE TWO TIME - INITIAL BASE CHARGE: Performed by: OBSTETRICS & GYNECOLOGY

## 2023-12-06 PROCEDURE — 3700000001 HC GENERAL ANESTHESIA TIME - INITIAL BASE CHARGE: Performed by: OBSTETRICS & GYNECOLOGY

## 2023-12-06 PROCEDURE — 2500000004 HC RX 250 GENERAL PHARMACY W/ HCPCS (ALT 636 FOR OP/ED): Performed by: OBSTETRICS & GYNECOLOGY

## 2023-12-06 PROCEDURE — 81025 URINE PREGNANCY TEST: CPT | Performed by: OBSTETRICS & GYNECOLOGY

## 2023-12-06 RX ORDER — SODIUM CHLORIDE 0.9 G/100ML
IRRIGANT IRRIGATION AS NEEDED
Status: DISCONTINUED | OUTPATIENT
Start: 2023-12-06 | End: 2023-12-06 | Stop reason: HOSPADM

## 2023-12-06 RX ORDER — CELECOXIB 200 MG/1
400 CAPSULE ORAL ONCE
Status: DISCONTINUED | OUTPATIENT
Start: 2023-12-06 | End: 2023-12-06 | Stop reason: HOSPADM

## 2023-12-06 RX ORDER — DEXAMETHASONE SODIUM PHOSPHATE 4 MG/ML
INJECTION, SOLUTION INTRA-ARTICULAR; INTRALESIONAL; INTRAMUSCULAR; INTRAVENOUS; SOFT TISSUE AS NEEDED
Status: DISCONTINUED | OUTPATIENT
Start: 2023-12-06 | End: 2023-12-06

## 2023-12-06 RX ORDER — METOCLOPRAMIDE HYDROCHLORIDE 5 MG/ML
INJECTION INTRAMUSCULAR; INTRAVENOUS AS NEEDED
Status: DISCONTINUED | OUTPATIENT
Start: 2023-12-06 | End: 2023-12-06

## 2023-12-06 RX ORDER — PROPOFOL 10 MG/ML
INJECTION, EMULSION INTRAVENOUS AS NEEDED
Status: DISCONTINUED | OUTPATIENT
Start: 2023-12-06 | End: 2023-12-06

## 2023-12-06 RX ORDER — GABAPENTIN 300 MG/1
600 CAPSULE ORAL ONCE
Status: DISCONTINUED | OUTPATIENT
Start: 2023-12-06 | End: 2023-12-06 | Stop reason: HOSPADM

## 2023-12-06 RX ORDER — OXYCODONE HYDROCHLORIDE 5 MG/1
5 TABLET ORAL EVERY 6 HOURS PRN
Qty: 10 TABLET | Refills: 0 | Status: SHIPPED | OUTPATIENT
Start: 2023-12-06

## 2023-12-06 RX ORDER — ONDANSETRON HYDROCHLORIDE 2 MG/ML
INJECTION, SOLUTION INTRAVENOUS AS NEEDED
Status: DISCONTINUED | OUTPATIENT
Start: 2023-12-06 | End: 2023-12-06

## 2023-12-06 RX ORDER — ONDANSETRON 4 MG/1
4 TABLET, FILM COATED ORAL EVERY 6 HOURS PRN
Qty: 10 TABLET | Refills: 0 | Status: SHIPPED | OUTPATIENT
Start: 2023-12-06

## 2023-12-06 RX ORDER — IBUPROFEN 600 MG/1
600 TABLET ORAL EVERY 6 HOURS PRN
Qty: 20 TABLET | Refills: 0 | Status: SHIPPED | OUTPATIENT
Start: 2023-12-06

## 2023-12-06 RX ORDER — KETOROLAC TROMETHAMINE 30 MG/ML
INJECTION, SOLUTION INTRAMUSCULAR; INTRAVENOUS AS NEEDED
Status: DISCONTINUED | OUTPATIENT
Start: 2023-12-06 | End: 2023-12-06

## 2023-12-06 RX ORDER — ACETAMINOPHEN 325 MG/1
975 TABLET ORAL EVERY 6 HOURS PRN
Qty: 20 TABLET | Refills: 0 | Status: SHIPPED | OUTPATIENT
Start: 2023-12-06

## 2023-12-06 RX ORDER — MIDAZOLAM HYDROCHLORIDE 1 MG/ML
INJECTION, SOLUTION INTRAMUSCULAR; INTRAVENOUS AS NEEDED
Status: DISCONTINUED | OUTPATIENT
Start: 2023-12-06 | End: 2023-12-06

## 2023-12-06 RX ORDER — LIDOCAINE HYDROCHLORIDE 20 MG/ML
INJECTION, SOLUTION INFILTRATION; PERINEURAL AS NEEDED
Status: DISCONTINUED | OUTPATIENT
Start: 2023-12-06 | End: 2023-12-06

## 2023-12-06 RX ORDER — CEFAZOLIN 1 G/1
INJECTION, POWDER, FOR SOLUTION INTRAVENOUS AS NEEDED
Status: DISCONTINUED | OUTPATIENT
Start: 2023-12-06 | End: 2023-12-06

## 2023-12-06 RX ORDER — BUPIVACAINE HYDROCHLORIDE 5 MG/ML
INJECTION, SOLUTION PERINEURAL AS NEEDED
Status: DISCONTINUED | OUTPATIENT
Start: 2023-12-06 | End: 2023-12-06 | Stop reason: HOSPADM

## 2023-12-06 RX ORDER — ASPIRIN 81 MG
100 TABLET, DELAYED RELEASE (ENTERIC COATED) ORAL 2 TIMES DAILY
Qty: 30 TABLET | Refills: 0 | Status: SHIPPED | OUTPATIENT
Start: 2023-12-06 | End: 2023-12-21

## 2023-12-06 RX ORDER — ROCURONIUM BROMIDE 10 MG/ML
INJECTION, SOLUTION INTRAVENOUS AS NEEDED
Status: DISCONTINUED | OUTPATIENT
Start: 2023-12-06 | End: 2023-12-06

## 2023-12-06 RX ORDER — ACETAMINOPHEN 325 MG/1
975 TABLET ORAL ONCE
Status: DISCONTINUED | OUTPATIENT
Start: 2023-12-06 | End: 2023-12-06 | Stop reason: HOSPADM

## 2023-12-06 RX ORDER — FENTANYL CITRATE 50 UG/ML
INJECTION, SOLUTION INTRAMUSCULAR; INTRAVENOUS AS NEEDED
Status: DISCONTINUED | OUTPATIENT
Start: 2023-12-06 | End: 2023-12-06

## 2023-12-06 RX ADMIN — SODIUM CHLORIDE, SODIUM LACTATE, POTASSIUM CHLORIDE, AND CALCIUM CHLORIDE: 600; 310; 30; 20 INJECTION, SOLUTION INTRAVENOUS at 13:20

## 2023-12-06 RX ADMIN — LIDOCAINE HYDROCHLORIDE 100 MG: 20 INJECTION, SOLUTION INFILTRATION; PERINEURAL at 13:29

## 2023-12-06 RX ADMIN — METOCLOPRAMIDE 10 MG: 5 INJECTION, SOLUTION INTRAMUSCULAR; INTRAVENOUS at 13:40

## 2023-12-06 RX ADMIN — ROCURONIUM BROMIDE 50 MG: 10 INJECTION, SOLUTION INTRAVENOUS at 13:29

## 2023-12-06 RX ADMIN — ONDANSETRON 4 MG: 2 INJECTION, SOLUTION INTRAMUSCULAR; INTRAVENOUS at 13:40

## 2023-12-06 RX ADMIN — FENTANYL CITRATE 100 MCG: 50 INJECTION, SOLUTION INTRAMUSCULAR; INTRAVENOUS at 13:29

## 2023-12-06 RX ADMIN — CEFAZOLIN 2 G: 330 INJECTION, POWDER, FOR SOLUTION INTRAMUSCULAR; INTRAVENOUS at 13:41

## 2023-12-06 RX ADMIN — SODIUM CHLORIDE, SODIUM LACTATE, POTASSIUM CHLORIDE, AND CALCIUM CHLORIDE: 600; 310; 30; 20 INJECTION, SOLUTION INTRAVENOUS at 14:38

## 2023-12-06 RX ADMIN — DEXAMETHASONE SODIUM PHOSPHATE 4 MG: 4 INJECTION, SOLUTION INTRA-ARTICULAR; INTRALESIONAL; INTRAMUSCULAR; INTRAVENOUS; SOFT TISSUE at 13:39

## 2023-12-06 RX ADMIN — PROPOFOL 200 MG: 10 INJECTION, EMULSION INTRAVENOUS at 13:30

## 2023-12-06 RX ADMIN — KETOROLAC TROMETHAMINE 30 MG: 30 INJECTION INTRAMUSCULAR; INTRAVENOUS at 14:45

## 2023-12-06 RX ADMIN — MIDAZOLAM HYDROCHLORIDE 2 MG: 1 INJECTION, SOLUTION INTRAMUSCULAR; INTRAVENOUS at 13:29

## 2023-12-06 RX ADMIN — SUGAMMADEX 200 MG: 100 INJECTION, SOLUTION INTRAVENOUS at 14:52

## 2023-12-06 SDOH — HEALTH STABILITY: MENTAL HEALTH: CURRENT SMOKER: 0

## 2023-12-06 ASSESSMENT — PAIN SCALES - GENERAL
PAINLEVEL_OUTOF10: 0 - NO PAIN

## 2023-12-06 ASSESSMENT — PAIN - FUNCTIONAL ASSESSMENT
PAIN_FUNCTIONAL_ASSESSMENT: 0-10

## 2023-12-06 ASSESSMENT — COLUMBIA-SUICIDE SEVERITY RATING SCALE - C-SSRS
1. IN THE PAST MONTH, HAVE YOU WISHED YOU WERE DEAD OR WISHED YOU COULD GO TO SLEEP AND NOT WAKE UP?: NO
6. HAVE YOU EVER DONE ANYTHING, STARTED TO DO ANYTHING, OR PREPARED TO DO ANYTHING TO END YOUR LIFE?: NO
2. HAVE YOU ACTUALLY HAD ANY THOUGHTS OF KILLING YOURSELF?: NO

## 2023-12-06 NOTE — H&P
H&P below written by me and up to date.    Benita Huynh MD PGY 5  Reproductive Endocrinology and Infertility Fellow  23   1:16 PM          Surgical Pre-Op H&P    History Of Present Illness  Sony Joy is a 38 y.o. female presenting for scheduled surgery: laparoscopy, possible lysis of adhesions/excision of endometriosis, possible neosalpingostomy versus salpingectomy      The patient reports that she does not want to have fallopian tubes removed with this procedure.     Past Medical History  She has a past medical history of Encounter for gynecological examination (general) (routine) without abnormal findings (2022), Encounter for screening for malignant neoplasm of cervix (2022), Other conditions influencing health status (2022), and Vitamin D deficiency, unspecified (2022).    Surgical History  She has no past surgical history on file.     OB History  OB History    Para Term  AB Living   0 0 0 0 0 0   SAB IAB Ectopic Multiple Live Births   0 0 0 0 0       Review of Systems   All other systems reviewed and are negative.       Physical Exam  Vitals reviewed.   Constitutional:       Appearance: Normal appearance.   HENT:      Head: Normocephalic.   Cardiovascular:      Rate and Rhythm: Normal rate.   Pulmonary:      Effort: Pulmonary effort is normal.   Abdominal:      Palpations: Abdomen is soft.      Tenderness: There is no abdominal tenderness. There is no guarding or rebound.   Neurological:      Mental Status: She is alert and oriented to person, place, and time.          Last Recorded Vitals: to be done on admission    Relevant Results  Medications    Current Facility-Administered Medications:     acetaminophen (Tylenol) tablet 975 mg, 975 mg, oral, Once, Dorothy Martinez MD    celecoxib (CeleBREX) capsule 400 mg, 400 mg, oral, Once, Dorothy Martinez MD    gabapentin (Neurontin) capsule 600 mg, 600 mg, oral, Once, Dorothy Martinez MD    Labs  CBC  No results  "found for: \"WBC\", \"NRBC\", \"RBC\", \"HGB\", \"HCT\", \"MCV\", \"MCH\", \"MCHC\", \"RDW\", \"PLT\", \"MPV\"      CMP  No results found for: \"GLUCOSE\", \"NA\", \"K\", \"CL\", \"CO2\", \"ANIONGAP\", \"BUN\", \"CREATININE\", \"EGFR\", \"CALCIUM\", \"ALBUMIN\", \"ALKPHOS\", \"PROT\", \"AST\", \"BILITOT\", \"ALT\"      Coagulation   No results found for: \"PROTIME\", \"INR\", \"APTT\", \"FIBRINOGEN\"    Pregnancy Tests  No results found for: \"HCGQUANT\", \"HCGURINE\"      Imaging   HSG:    Findings:           Uterus - normal contour without filling defects           Tubes- right tube with fill and spill with normal architecture, left tube with fill and spill with some distal dilation        Assessment/Plan   Sony Joy is a 38 y.o. female presenting for scheduled surgery: laparoscopy, possible lysis of adhesions/excision of endometriosis, possible neosalpingostomy versus salpingectomy       Benita Huynh MD PGY 5  Reproductive Endocrinology and Infertility Fellow    "

## 2023-12-06 NOTE — ANESTHESIA PREPROCEDURE EVALUATION
Patient: Sony Joy    Procedure Information       Date/Time: 12/06/23 1230    Procedures:       Laparoscopic Endometriosis Excision (Pelvis)      Chromopertubation (Bilateral: Pelvis) - Probate Blue; Renea with Purple Tip      Diagnostic Hysteroscopy (Pelvis)      Possible Ovarian Cystectomy (Bilateral: Pelvis)    Location: [facility] OR 09 / [facility] OR    Surgeons: Emir Brown MD            Relevant Problems   Anesthesia (within normal limits)      Cardiovascular (within normal limits)      Endocrine (within normal limits)      GI (within normal limits)      /Renal (within normal limits)      Neuro/Psych (within normal limits)      Pulmonary (within normal limits)      GI/Hepatic (within normal limits)      Hematology (within normal limits)      Musculoskeletal (within normal limits)      Eyes, Ears, Nose, and Throat (within normal limits)      Infectious Disease (within normal limits)       Clinical information reviewed:   Tobacco  Allergies  Meds   Med Hx  Surg Hx   Fam Hx  Soc Hx        NPO Detail:  NPO/Void Status  Date of Last Liquid: 12/06/23  Time of Last Liquid: 0830  Date of Last Solid: 12/05/23  Time of Last Solid: 2200         Physical Exam    Airway  Mallampati: II  TM distance: >3 FB  Neck ROM: full     Cardiovascular    Dental    Pulmonary    Abdominal            Anesthesia Plan    ASA 1     general     The patient is not a current smoker.    intravenous induction   Anesthetic plan and risks discussed with patient.    Plan discussed with CRNA.

## 2023-12-06 NOTE — OP NOTE
Date: 2023  OR Location: [facility] OR    Name: Sony Joy, : 1985, Age: 38 y.o., MRN: [id number], Sex: female    Diagnosis  Pre-op Diagnosis     * Chronic pelvic pain in female [R10.2, G89.29]     * Abnormal uterine bleeding (AUB) [N93.9] Post-op Diagnosis     * Chronic pelvic pain in female [R10.2, G89.29]     * Abnormal uterine bleeding (AUB) [N93.9]     Procedures  Diagnostic Laparoscopy with Peritoneal Biopsies  [id number]     Left Paratubal Cystectomy  [id number]     Surgeons      * Emir Brown - Primary    Resident/Fellow/Other Assistant:  Surgeon(s) and Role:  Benita Huynh, Fellow  Mile Perkins, Resident    Procedure Summary  Anesthesia: General  ASA: I  Anesthesia Staff: Anesthesiologist: Gen Rae MD  CRNA: KATHIA Conley-CRNA  Estimated Blood Loss: 5mL  Intra-op Medications:   Medication Name Total Dose   BUPivacaine HCl (Marcaine) 0.5 % (5 mg/mL) injection 13 mL   sodium chloride 0.9 % irrigation solution 1,000 mL              Anesthesia Record               Intraprocedure I/O Totals          Intake    LR 1100.00 mL    Total Intake 1100 mL          Specimen:   ID Type Source Tests Collected by Time   1 : PERITONEAL BIOPSY Tissue PERITONEUM BIOPSY SURGICAL PATHOLOGY EXAM Emir Brown MD 2023 1429   2 : POSTERIOR PERITONEAL BIOPSY Tissue PERITONEUM BIOPSY SURGICAL PATHOLOGY EXAM Emir Brown MD 2023 1437   3 : LEFT PERITONEAL BIOPSY Tissue PERITONEUM BIOPSY SURGICAL PATHOLOGY EXAM Emir Brown MD 2023 1445        Staff:   Circulator: Caprice Huff RN; Gordo Murillo RN  Scrub Person: Zakia Cruz; Fozia Cardenas    Findings: normal appearing cervix, uterus, and bilateral ovaries. Small paratubal cyst of left fallopian tube. Normal appearing peritoneum, no evidence of endometriosis.      Procedure Details:  After informed consent was obtained, the patient was taken to the operating room and the general anesthesia was administered. She was then  positioned in the dorsal lithotomy position and prepped and draped in the normal sterile fashion.  Rust catheter was placed.    The abdomen was entered using a closed veress technique. Towel clamps were placed on either side of the umbilicus to elevate the skin. A scalpel was used to make a 5mm vertical infraumbilical incision through the skin. The veress needle was placed at a 45 degree angle and correct intraabdominal placement was confirmed with a positive drop test and an opening pressure of less than 10 mm Hg. The abdomen was insufflated to 15 mmHg. The veress needle was then removed and a 5mm trocar and laparoscope were placed under direct visualization.    Initial survey of the abdomen found it to be devoid of trauma from entry with normal appearing liver, bowel, and uterus. Two additional 5 mm trocar ports were placed, one in the LLQ and one in the RLQ under direct visualization.     Additional survey of the pelvis was performed. Uterus appeared to be normal. No evidence of endometriosis was seen in the bilateral ovarian fossa, anterior, or posterior cul-du-sacs. Bilateral ovaries appeared normal, and a small peritubal cyst was noted on the left fallopian tube. Tubes were otherwise normal. Decision was made to proceed with peritoneal biopsies and excision of paratubal cyst.    To aid in visualization, a speculum was then placed in the vagina. The cervix was visualized and the anterior lip of the cervix was grasped with the single tooth tenaculum. At this point, the Humi uterine manipulator was placed in the cervix and the speculum was removed.     The right ureter was identified transperitoneally. The peritoneum of the right ovarian fossa was grasped with a laparoscopic allis, tented up, and excised using cold scissors. This was repeated on the medial posterior cul-du-sac. Both sites were hemostatic with minimal electrocautery.     Attention was then turned to the left paratubal cyst. The tubal serosa was  incised and the cyst wall identified. The cyst was carefully dissected out intact using a combination of sharp and blunt dissection. It was removed from the abdomen. The site was noted to be hemostatic after irrigation, electrocautery and Benitez placement. Additional Benitez was placed over the right ovarian fossa biopsy site.     At this point, all ports were removed. The CO2 gas was disconnected and the abdomen was desufflated. All laparoscopic incisions were closed with a 4-0 Monocryl in a subcuticular interrupted fashion. Dermabond was placed. At the end of the procedure, the uterine manipulator and Rust were removed, and the patient was taken to recovery in stable condition. The patient tolerated the procedure well. Sponge, lap, and needle counts were correct x2. Dr. Brown was present for the entire procedure. Patient was returned to postoperative recovery in stable condition.      Complications:  None; patient tolerated the procedure well.     Disposition: PACU - hemodynamically stable.  Condition: stable    I was present for the entirety of the procedure(s) and agree with documentation as seen in the operative note.    Emir Brown MD  Reproductive Endocrinology and Infertility   [facility]

## 2023-12-06 NOTE — ANESTHESIA POSTPROCEDURE EVALUATION
Patient: Sony Joy    Procedure Summary       Date: 12/06/23 Room / Location: [facility] OR 09 / [facility]    Anesthesia Start: 1321 Anesthesia Stop: 1509    Procedures:       Diagnostic Laparoscopy with Peritoneal Biopsies (Pelvis)      Left Paratubal Cystectomy (Bilateral: Pelvis) Diagnosis:       Chronic pelvic pain in female      Abnormal uterine bleeding (AUB)      (Chronic pelvic pain in female [R10.2, G89.29])      (Abnormal uterine bleeding (AUB) [N93.9])    Surgeons: Emir Brown MD Responsible Provider: Gen Rae MD    Anesthesia Type: general ASA Status: 1            Anesthesia Type: general    Vitals Value Taken Time   /86 12/06/23 1544   Temp 36.2 °C (97.2 °F) 12/06/23 1544   Pulse 76 12/06/23 1544   Resp 13 12/06/23 1544   SpO2 99 % 12/06/23 1544       Anesthesia Post Evaluation    Patient location during evaluation: PACU  Patient participation: complete - patient participated  Level of consciousness: awake  Pain management: adequate  Multimodal analgesia pain management approach  Airway patency: patent  Cardiovascular status: acceptable  Respiratory status: acceptable  Hydration status: acceptable  Postoperative Nausea and Vomiting: none  Comments: Will continue to assess PONV status.        No notable events documented.

## 2023-12-06 NOTE — PERIOPERATIVE NURSING NOTE
1545  Arrival to Phase 2. Awake and alert. States pain is tolerable. Lap sites to abdomen x3 sealed with dermabond cdi. Mother brought to bedside.     1600 Ambulated to bathroom and voided without difficulty. Discharge instructions reviewed with pt and mother, verbalized understanding. PIV removed and pt getting dressed.     1645 Transported to [facility] via wheelchair and discharged to home with mother.

## 2023-12-06 NOTE — ANESTHESIA PROCEDURE NOTES
Airway  Date/Time: 12/6/2023 1:33 PM  Urgency: elective    Airway not difficult    Staffing  Performed: CRNA   Authorized by: Gen Rae MD    Performed by: KATHIA Conley-KATHY  Patient location during procedure: OR    Indications and Patient Condition  Indications for airway management: anesthesia and airway protection  Spontaneous ventilation: present  Preoxygenated: yes  Patient position: sniffing  Mask difficulty assessment: 1 - vent by mask    Final Airway Details  Final airway type: endotracheal airway      Successful airway: ETT  Cuffed: yes   Successful intubation technique: direct laryngoscopy  Blade: Shalini  Blade size: #3  ETT size (mm): 6.5  Cormack-Lehane Classification: grade I - full view of glottis  Placement verified by: chest auscultation   Measured from: lips  ETT to lips (cm): 21  Number of attempts at approach: 1  Number of other approaches attempted: 0

## 2023-12-06 NOTE — PERIOPERATIVE NURSING NOTE
1511: patient to bay, anesthesia and service at bedside. Patient in stable condition with stable vital signs.     1518: family notified    1544: Phase I end    1545: Phase II started. Patient transported to phase II in stable condition and no pain, VSS.

## 2023-12-07 LAB — CMV IGM SERPL-ACNC: <8 AU/ML

## 2023-12-11 ENCOUNTER — TELEPHONE (OUTPATIENT)
Dept: ENDOCRINOLOGY | Facility: CLINIC | Age: 38
End: 2023-12-11
Payer: COMMERCIAL

## 2023-12-11 DIAGNOSIS — N97.9 FEMALE INFERTILITY: ICD-10-CM

## 2023-12-11 RX ORDER — CLOMIPHENE CITRATE 50 MG/1
TABLET ORAL
Qty: 10 TABLET | Refills: 1 | Status: SHIPPED | OUTPATIENT
Start: 2023-12-11 | End: 2023-12-12

## 2023-12-11 NOTE — TELEPHONE ENCOUNTER
Patient would like a call back - wanted to know if it would be safe for her to start Clomid this month. Started her cycle Friday 12/8

## 2023-12-11 NOTE — TELEPHONE ENCOUNTER
Message was sent to patient that Dr. Martinez was ok giving her clomid for TIC cycle. Awaiting boarding pass sign off to order medication for patient to proceed. Patient will be on CD 5 tomorrow, and aware if BP not signed off today, can still start medications tomrorow.    MARK ASH on 12/11/23 at 4:29 PM.

## 2023-12-12 ENCOUNTER — SPECIALTY PHARMACY (OUTPATIENT)
Dept: PHARMACY | Facility: CLINIC | Age: 38
End: 2023-12-12

## 2023-12-12 ENCOUNTER — PHARMACY VISIT (OUTPATIENT)
Dept: PHARMACY | Facility: CLINIC | Age: 38
End: 2023-12-12
Payer: COMMERCIAL

## 2023-12-12 ENCOUNTER — TELEPHONE (OUTPATIENT)
Dept: ENDOCRINOLOGY | Facility: CLINIC | Age: 38
End: 2023-12-12

## 2023-12-12 PROCEDURE — RXMED WILLOW AMBULATORY MEDICATION CHARGE

## 2023-12-12 RX ORDER — CLOMIPHENE CITRATE 50 MG/1
TABLET ORAL
Qty: 10 TABLET | Refills: 1 | Status: SHIPPED | OUTPATIENT
Start: 2023-12-12

## 2023-12-19 ENCOUNTER — TELEMEDICINE (OUTPATIENT)
Dept: ENDOCRINOLOGY | Facility: CLINIC | Age: 38
End: 2023-12-19
Payer: COMMERCIAL

## 2023-12-19 ENCOUNTER — APPOINTMENT (OUTPATIENT)
Dept: ENDOCRINOLOGY | Facility: CLINIC | Age: 38
End: 2023-12-19
Payer: COMMERCIAL

## 2023-12-19 VITALS — BODY MASS INDEX: 24.55 KG/M2 | WEIGHT: 130 LBS | HEIGHT: 61 IN

## 2023-12-19 DIAGNOSIS — N94.6 DYSMENORRHEA: Primary | ICD-10-CM

## 2023-12-19 PROCEDURE — 99024 POSTOP FOLLOW-UP VISIT: CPT | Performed by: NURSE PRACTITIONER

## 2023-12-19 ASSESSMENT — PATIENT HEALTH QUESTIONNAIRE - PHQ9
1. LITTLE INTEREST OR PLEASURE IN DOING THINGS: NOT AT ALL
SUM OF ALL RESPONSES TO PHQ9 QUESTIONS 1 AND 2: 0
2. FEELING DOWN, DEPRESSED OR HOPELESS: NOT AT ALL

## 2023-12-19 ASSESSMENT — PAIN SCALES - GENERAL: PAINLEVEL: 0-NO PAIN

## 2023-12-19 NOTE — PROGRESS NOTES
"  Virtual Visit    Post Op Visit    Seen Sony Joy s/p Diagnostic Laparoscopy with Peritoneal Biopsies with Dr. Brown 12/6/23. Doing well.   Incisions are healing well.   No issues with bowel or bladder.   No nausea, vomiting, fever or chills  No pain medications.   No vaginal discharge or abnormal bleeding.     Vitals: Ht 1.549 m (5' 1\")   Wt 59 kg (130 lb)   LMP 12/08/2023 (Exact Date)   BMI 24.56 kg/m²   Abdomen: soft, non-tender, incisions healing.    Prior labs:   CBC  8.0; 12.2; 36.8; 351  CMP  76; 139; 3.7; 104; 26; 13; 10; 0.67; >90; 8.8    Assessment:   Normal post op    Plan:  Reviewed surgery and pathology report  Follow up with Dr. Brown at 6 weeks post op FUV to confirm plan of care  Briefly discussed plan for Clomid with OPK timed IUI   Ashok is going to schedule FUV with Dr. Peres with  present for visit. Ashok is going to schedule sperm freezes and viromed testing in [place]  12/19/2023  6:07 PM  "

## 2023-12-22 ENCOUNTER — TELEPHONE (OUTPATIENT)
Dept: ENDOCRINOLOGY | Facility: CLINIC | Age: 38
End: 2023-12-22

## 2023-12-26 ENCOUNTER — TELEPHONE (OUTPATIENT)
Dept: ENDOCRINOLOGY | Facility: CLINIC | Age: 38
End: 2023-12-26
Payer: COMMERCIAL

## 2023-12-26 ENCOUNTER — OFFICE VISIT (OUTPATIENT)
Dept: ENDOCRINOLOGY | Facility: CLINIC | Age: 38
End: 2023-12-26
Payer: COMMERCIAL

## 2023-12-26 VITALS
DIASTOLIC BLOOD PRESSURE: 86 MMHG | BODY MASS INDEX: 24.55 KG/M2 | HEART RATE: 77 BPM | RESPIRATION RATE: 16 BRPM | HEIGHT: 61 IN | SYSTOLIC BLOOD PRESSURE: 118 MMHG | TEMPERATURE: 97.9 F | WEIGHT: 130 LBS

## 2023-12-26 DIAGNOSIS — Z09 POSTOP CHECK: Primary | ICD-10-CM

## 2023-12-26 PROCEDURE — 99024 POSTOP FOLLOW-UP VISIT: CPT | Performed by: OBSTETRICS & GYNECOLOGY

## 2023-12-26 PROCEDURE — 1036F TOBACCO NON-USER: CPT | Performed by: STUDENT IN AN ORGANIZED HEALTH CARE EDUCATION/TRAINING PROGRAM

## 2023-12-26 PROCEDURE — 99212 OFFICE O/P EST SF 10 MIN: CPT | Mod: GC | Performed by: OBSTETRICS & GYNECOLOGY

## 2023-12-26 ASSESSMENT — PAIN SCALES - GENERAL: PAINLEVEL: 0-NO PAIN

## 2023-12-26 NOTE — PROGRESS NOTES
"In Person    Post Op Visit    Seen Sony Joy s/p laparoscopy. Presenting today with concern for umbilical incision.     Denies drainage, fever, chills, nausea, vomiting, shortness of breath. Having regular bowel movements.    Lateral incisions are healing well. Concerned about umbilical incision. Glue came off this weekend. Denies bleeding, erythema.    No vaginal discharge or abnormal bleeding.     Vitals: /86   Pulse 77   Temp 36.6 °C (97.9 °F) (Oral)   Resp 16   Ht 1.549 m (5' 1\")   Wt 59 kg (130 lb)   LMP 12/08/2023 (Exact Date)   BMI 24.56 kg/m²   Abdomen: soft, non-tender, incisions healing well. Umbilical incision with slight gap at skin edge, however intact without erythema, drainage or bleeding. Steri-strip placed over incision    Prior labs:   CBC  8.0; 12.2; 36.8; 351  CMP  76; 139; 3.7; 104; 26; 13; 10; 0.67; >90; 8.8    Assessment:   Normal post op; incisions healing well    Plan:  Explained may take 6-8 weeks for incisions to completely heal. No signs of infection today. Steri-strips placed. Pt would like to purchase skin glue to place on incision (not available in clinic) which I stated is okay.    Benita Huynh  12/26/2023  2:04 PM  "

## 2023-12-26 NOTE — TELEPHONE ENCOUNTER
Spoke to Dr. Huynh- can see patient today at 2pm at [place] office to evaluate patient and incision, patient ok with this time, will have  add her on to the schedule.    Patient also to call with period if not pregnant after this cycle, has refill of Clomid at [facility].    12/26/23 at 9:43 AM - Dorothy Barrera RN

## 2023-12-26 NOTE — TELEPHONE ENCOUNTER
"Patient would like a call back - said her incision site isn't doing very well. The skin glue opened up and the skin is irritated and \"kind of tingly.\"   "

## 2023-12-27 ENCOUNTER — TELEPHONE (OUTPATIENT)
Dept: ENDOCRINOLOGY | Facility: CLINIC | Age: 38
End: 2023-12-27

## 2024-01-02 ENCOUNTER — TELEPHONE (OUTPATIENT)
Dept: ENDOCRINOLOGY | Facility: CLINIC | Age: 39
End: 2024-01-02

## 2024-01-02 NOTE — TELEPHONE ENCOUNTER
Patient would like a call back - Said she's returning a call from a couple of weekends ago about an update. Also wants to make sure her partner is supposed to be coming in next week for SF. Wanted to follow up on the waiver. Said she's been trying to get a hold of someone for 2 weeks.

## 2024-01-03 ENCOUNTER — DOCUMENTATION (OUTPATIENT)
Dept: ENDOCRINOLOGY | Facility: CLINIC | Age: 39
End: 2024-01-03
Payer: COMMERCIAL

## 2024-01-03 NOTE — PROGRESS NOTES
"Called pt, discussed that Ashok still needs follow up with psychology with his  present. Ashok is aware. Advised pt that Ashok's orders for Sperm freeze and viromeds are in and Ashok just needs to call to schedule (will send reminder via [employer] to Ashok). Advised pt that case was discussed with team including Dr. Martinez and although we strongly recommend legal contracts to protect the parental rights of Both the patient and Ashok since the child with be biologically both of theirs and they both desire to raise child together that we will not require this contract or documentation of it. Discussed that ultimately after the child is born if pt and Ashok have disagreement about parental rights IF they do not have a contract in place ahead of time they would have to hire  and go to court to have a  decide the custody of the child. Pt verbalized understanding.   Discussed with pt that because Ashok is not donating his sperm, but desiring conception together through IUI that he will be required to \"sign off\"/consent to thawing sperm each month pt desires to proceed with IUI. Briefly discussed that we may be able to send/have Ashok sign this consent electronically via [employer]. Also advised pt that possession of the sperm will remain with Ashok and Ashok will complete a disposition form at time of collection.   Pt verbalized understanding and agrees with the plan.  Reviewed and approved by LOTTIE BARNEY on 1/3/24 at 4:26 PM.   "

## 2024-01-08 ENCOUNTER — SPECIALTY PHARMACY (OUTPATIENT)
Dept: PHARMACY | Facility: CLINIC | Age: 39
End: 2024-01-08

## 2024-01-08 ENCOUNTER — PHARMACY VISIT (OUTPATIENT)
Dept: PHARMACY | Facility: CLINIC | Age: 39
End: 2024-01-08
Payer: COMMERCIAL

## 2024-01-08 DIAGNOSIS — Z31.89 ENCOUNTER FOR ARTIFICIAL INSEMINATION: ICD-10-CM

## 2024-01-08 LAB
LABORATORY COMMENT REPORT: NORMAL
PATH REPORT.FINAL DX SPEC: NORMAL
PATH REPORT.GROSS SPEC: NORMAL
PATH REPORT.RELEVANT HX SPEC: NORMAL
PATH REPORT.TOTAL CANCER: NORMAL

## 2024-01-08 PROCEDURE — RXMED WILLOW AMBULATORY MEDICATION CHARGE

## 2024-01-15 ENCOUNTER — DOCUMENTATION (OUTPATIENT)
Dept: ENDOCRINOLOGY | Facility: CLINIC | Age: 39
End: 2024-01-15
Payer: COMMERCIAL

## 2024-01-15 ENCOUNTER — TELEMEDICINE (OUTPATIENT)
Dept: ENDOCRINOLOGY | Facility: CLINIC | Age: 39
End: 2024-01-15
Payer: COMMERCIAL

## 2024-01-19 ENCOUNTER — TELEMEDICINE (OUTPATIENT)
Dept: ENDOCRINOLOGY | Facility: CLINIC | Age: 39
End: 2024-01-19
Payer: COMMERCIAL

## 2024-01-19 VITALS — HEIGHT: 61 IN | WEIGHT: 130 LBS | BODY MASS INDEX: 24.55 KG/M2

## 2024-01-19 DIAGNOSIS — Z87.42 HISTORY OF ENDOMETRIOSIS: ICD-10-CM

## 2024-01-19 DIAGNOSIS — N97.9 FEMALE INFERTILITY: Primary | ICD-10-CM

## 2024-01-19 PROCEDURE — 99213 OFFICE O/P EST LOW 20 MIN: CPT | Performed by: NURSE PRACTITIONER

## 2024-01-19 ASSESSMENT — PAIN SCALES - GENERAL: PAINLEVEL: 0-NO PAIN

## 2024-01-19 NOTE — PROGRESS NOTES
Virtual Visit    Follow Up Visit HPI    Patient is a 38 y.o.  female with Unexplained Infertility, Male infertility, and History of Endometriosis presenting today for follow up visit.     Testing to date:   Result Date Done   Type and Screen: A 2023   Rh: POS 2023   Antibody: NEG (Ref range: )  No results found for requested labs within last 1825 days.   Rubella: Positive (A; Ref range: Negative) 10/3/2023   Varicella: Equivocal (A; Ref range: Negative) 10/3/2023   TSH: 1.58 (Ref range: 0.44 - 3.98 mIU/L) 2023   AMH: No results found for requested labs within last 1825 days. No results found for requested labs within last 1825 days.   PRL: No results found for requested labs within last 365 days. No results found for requested labs within last 365 days.   Testosterone: No results found for requested labs within last 365 days. No results found for requested labs within last 365 days.   DHEAS: No results found for requested labs within last 365 days. No results found for requested labs within last 365 days.   FSH: No results found for requested labs within last 365 days. No results found for requested labs within last 365 days.   17 OHP: No results found for requested labs within last 365 days. No results found for requested labs within last 365 days.   Hepatitis B surface antigen: Nonreactive (Ref range: Nonreactive) 10/3/2023   Hepatitis C antibody: Nonreactive (Ref range: Nonreactive) 10/3/2023   HIV ½ Antigen Antibody screen with reflex: Nonreactive (Ref range: Nonreactive) 10/3/2023   Syphilis screening with reflex: Nonreactive (Ref range: Nonreactive) 10/3/2023   Other:     Hysterosalpingogram: FL HYSTEROSALPINGOGRAM (2023):   Bilateral Tubal Patency    Saline Infused Sonography: no, n/a    GYN Pelvic Ultrasound: US PELVIS TRANSVAGINAL (10/3/2023):   Cyst noted in left ovary - possible corpus luteum, free fluid noted in cul-de-sac  Uterus  Uterus:     Visualized  Uterus position:   retroverted  Description of uterine malformations:     arcuate uterus  Myometrium: heterogenous  Endometrium:      uniform echogenicity: isoechogenic  Cervix details:   normal  Uterus length     67.5 mm  Uterus width      47.1 mm  Uterus height     37.9 mm  Endometrial thickness, total  8.1 mm    Right Ovary  Rt ovary:   Visualized  Rt ovary morphology:    normal  Rt ovary D1 35.5 mm  Rt ovary D2 29.7 mm  Rt ovary D3 17.9 mm  Rt ovary Vol      9.9 cmï¿½  Rt ovarian follicle(s): Follicles identified  Rt ovarian follicles other findings:      Antral Follicles 9 < 10 mm    Left Ovary  Lt ovary:   Visualized  Lt ovary morphology:    normal  Lt ovary D1 46.7 mm  Lt ovary D2 31.1 mm  Lt ovary D3 25.0 mm  Lt ovary Vol      19.0 cmï¿½  Lt ovarian cyst(s):     Cysts identified  Lt ovarian cyst D1      12 mm  Lt ovarian cyst D2      11 mm  Lt ovarian cyst mean    11.5 mm  Lt ovarian cysts other findings:    Para ovarian simple  Lt ovarian follicle(s): Follicles identified  Lt ovarian follicles other findings:      Antral Follicles 10 < 10 mm    Cul de Sac  Visualized. Free fluid visualized  Largest pool 24.7 mm x 26.0 mm x 17.8 mm. Vol 5.985 ml    Partner SA: Oligospermia    Treatment to date:   Clomid 100 mg with at home insemination using friend (intended co-parent) Ashok's sperm x 1 cycle without conception   Multiple cycles of Natural cycle at home inseminations without conception    Past Medical History:   Diagnosis Date    Encounter for gynecological examination (general) (routine) without abnormal findings 04/22/2022    Well woman exam    Encounter for screening for malignant neoplasm of cervix 04/22/2022    Cervical cancer screening    Other conditions influencing health status 04/22/2022    Ambivalent attitude about trying to conceive    Vitamin D deficiency, unspecified 04/25/2022    Vitamin D deficiency     History reviewed. No pertinent surgical history.  Current Outpatient Medications on File Prior to Visit  "  Medication Sig Dispense Refill    acetaminophen (Tylenol) 325 mg tablet Take 3 tablets (975 mg) by mouth every 6 hours if needed for mild pain (1 - 3) for up to 20 doses. Take alternating with Ibuprofen (Patient not taking: Reported on 2023) 20 tablet 0    clomiPHENE (Clomid) 50 mg tablet Take 2 tablets by mouth on cycle days 3-7 as directed per provider. Latest to take is cycle day 5-9. (Patient not taking: Reported on 2023) 10 tablet 1    ibuprofen 600 mg tablet Take 1 tablet (600 mg) by mouth every 6 hours if needed for moderate pain (4 - 6) for up to 20 doses. Take alternating with tylenol (Patient not taking: Reported on 2023) 20 tablet 0    ondansetron (Zofran) 4 mg tablet Take 1 tablet (4 mg) by mouth every 6 hours if needed for nausea for up to 10 doses. (Patient not taking: Reported on 2023) 10 tablet 0    oxyCODONE (Roxicodone) 5 mg immediate release tablet Take 1 tablet (5 mg) by mouth every 6 hours if needed for severe pain (7 - 10) (for pain not controlled by tylenol and ibuprofen.) for up to 10 doses. Take with stool softener (Patient not taking: Reported on 2023) 10 tablet 0     No current facility-administered medications on file prior to visit.       BMI:   BMI Readings from Last 1 Encounters:   24 24.56 kg/m²     VITALS:  Ht 1.549 m (5' 1\")   Wt 59 kg (130 lb)   LMP 2024 (Exact Date)   BMI 24.56 kg/m²   LMP: Patient's last menstrual period was 2024 (exact date).    ASSESSMENT   38 y.o.  female with primary infertility x 1 year, Male infertility and History of Endometriosis and the following pertinent medical issues: none, n/a .    COUNSELING  Sperm is FDA ineligible, pt will need counseling from [facility] LOUISE and sign FDA waiver prior to using sperm sample for IUI. Pt agreeable.     Oligospermia, partner scheduled to see Dr. Kerr. Aware pregnancy rates with IUI are lower if sperm count at time of IUI is less than 1 million TMC. " "Recommend IVF, pt reports that at this time pt and partner would not consider IVF.     Reviewed with pt that CMV is a common virus and that the Center for Disease Control estimates that over half of adults have been infected with CMV by age 40, most with no signs or symptoms. CMV is transmitted via body fluids (saliva, urine, blood, tears, semen, and breast milk).     For this reason the \"sperm source\" needs to be assessed for CMV. Although, the risk of CMV transmission from washed sperm and/or embryos is very low.     Reviewed with pt risk of Congenital CMV. Reviewed that babies born with CMV can have brain, liver, spleen, lung, and growth problems. The most common long-term health problem in babies born with congenital CMV infection is hearing loss.     We discussed using Ovulation Predictor Kits to time fertility treatments.   Patient aware we recommend Clear Blue Easy Digital OPK (not advanced) however there are several choices for OPK on the market and any one that she chooses is fine to use.     We discussed that she should begin testing on cycle day 10 (Day 1 is first day of full flow menses).   We discussed that she is to use the second urine of the morning and call the office with + OPK prior to 4 pm that day if planning IUI.  If not planning IUI, patient advised to have intercourse day of + OPK.    Discussed use of luteal phase progesterone. Micronized progesterone (Prometrium) is an oral pill used vaginally to increase absorption to the uterus and decrease systemic side effects. It should be started 3 days post IUI, 4 days post + OPK, or 5 days post HCG trigger. A urine pregnancy test should be taken approximately two weeks after ovulation, if + stay on the progesterone and call the office, if negative stop the progesterone or it may delay the onset of menses.    We discussed that Clomid is used for ovulation induction. We discussed common side effects of Clomid including headaches, vision changes, hot " flashes, mood changes, and breast tenderness.  We discussed that there is an increased risk of multiple gestation with Clomid approximately 10% for twins and less than 1% for triplets.  Counseled regarding option of selective reduction for higher order multiples.  *Pt is aware that her rates of multiples may be slightly higher than this given that she has not has not had regular TIC/exposure to sperm for >12 cycles.       PLAN  No orders of the defined types were placed in this encounter.      FOLLOW UP   Consults: Financial consult  Intended Co-Parent Ashok has appointment with Dr. Kerr next month for oligospermia  Engaged MD  Take prenatal vitamins, vitamin D 2000 IUs daily  Discussed that treatment cannot proceed until checklist items are complete.   Patient to schedule follow up visit in 2-3 months. Advised patient to arrange this now with the .  Chart to primary nurse for care coordination and patient check list/education.  Recommend legal contracts, pt reports that she and Intended Co-Parent Ashok have contracts in place, verified with both pt and partner. Intent for both pt and partner is to co-parent. Explained to pt that if sufficient contracts and/or pre-birth order are not in place may have to go to court to determine custody arrangement of child since not , pt aware.   Clomid 100 mg CD 3-7 with OPK timed IUI using friend Ashok's sperm (Intended Co-Parent)   Pt to sign FDA waiver with [facility] Physician for use of Ineligible sperm prior to IUI   Pt desires to combine multiple vials of frozen sperm if TMC less than 1 million at time of thaw      Yvonne Lagos  01/19/2024  3:32 PM

## 2024-01-20 DIAGNOSIS — N97.8 ENCOUNTER FOR MALE FACTOR INFERTILITY IN FEMALE PATIENT: Primary | ICD-10-CM

## 2024-01-20 DIAGNOSIS — Z31.81 ENCOUNTER FOR MALE FACTOR INFERTILITY IN FEMALE PATIENT: Primary | ICD-10-CM

## 2024-01-21 ENCOUNTER — PROCEDURE VISIT (OUTPATIENT)
Dept: ENDOCRINOLOGY | Facility: CLINIC | Age: 39
End: 2024-01-21
Payer: COMMERCIAL

## 2024-01-21 ENCOUNTER — ANCILLARY PROCEDURE (OUTPATIENT)
Dept: ENDOCRINOLOGY | Facility: CLINIC | Age: 39
End: 2024-01-21
Payer: COMMERCIAL

## 2024-01-21 DIAGNOSIS — N97.8 ENCOUNTER FOR MALE FACTOR INFERTILITY IN FEMALE PATIENT: ICD-10-CM

## 2024-01-21 DIAGNOSIS — Z31.81 ENCOUNTER FOR MALE FACTOR INFERTILITY IN FEMALE PATIENT: ICD-10-CM

## 2024-01-21 DIAGNOSIS — Z31.89 ENCOUNTER FOR ARTIFICIAL INSEMINATION: ICD-10-CM

## 2024-01-21 LAB
ANALYZED TIME:: ABNORMAL
ANDROLOGY LAB ID#: ABNORMAL
CONCENTRATION(SEMEN): 16 MILL/ML (ref 15–?)
CRYOBANK: ABNORMAL
DONOR ID: ABNORMAL
NON PROG. MOTILITY (SEMEN): 11 %
PROG. MOTILITY (SEMEN): 6 % (ref 32–?)
RECEIVED TIME:: ABNORMAL
REMAINING VIALS: 2
THAWING PROTOCOL: ABNORMAL
TOTAL MOTILITY (SEMEN): 17 % (ref 40–?)
TOTAL NO OF MOTILE (SEMEN): 1.32 MILL
TOTAL NO OF SPERM (SEMEN): 8 MILL (ref 39–?)
VIAL ID: ABNORMAL
VOLUME (SEMEN): 0.5 ML (ref 1.5–?)

## 2024-01-21 PROCEDURE — 89353 THAWING CRYOPRESRVED SPERM: CPT | Performed by: OBSTETRICS & GYNECOLOGY

## 2024-01-21 PROCEDURE — 58322 ARTIFICIAL INSEMINATION: CPT | Performed by: STUDENT IN AN ORGANIZED HEALTH CARE EDUCATION/TRAINING PROGRAM

## 2024-01-21 NOTE — PROGRESS NOTES
Patient ID: Sony Joy is a 38 y.o. female.    Intrauterine Insemination (IUI)    Date/Time: 1/21/2024 10:56 AM    Performed by: Nicolette Mendoza MD  Authorized by: VICKI Alejandre    Consent:     Consent obtained:  Written    Consent given by:  Patient    Procedure risks and benefits discussed: yes      Patient questions answered: yes      Patient agrees, verbalizes understanding, and wants to proceed: yes      Instructions and paperwork completed: yes    Procedure:     Specimen source: donor      Specimen condition: frozen      Cytomegalovirus: Positive      Pelvic exam performed: yes      Speculum placed in vagina: yes      Type of insemination: intrauterine insemination      Ultrasound guidance: No      Speculum type: Beulah      Catheter type comment:  Michelle IUI catheter    Curvature: mild      Difficulty: easy      Estimated Blood Loss:  None    Specimen Return: minimal    Post-procedure:     Patient tolerated procedure well: yes      Post-procedure plan: supine with hips elevated for 15 minutes and patient counseled on signs and symptoms for which to call and/or return to clinic    Comments:     Procedure comments:  TMS 1.32

## 2024-01-21 NOTE — PROGRESS NOTES
"Counseled pt that Directed Sperm Donor Brock Charles Donor # [id number] is Ineligible.  Brock is aware that he is an Ineligible Donor and agreed to proceed and share reasons for Ineligibility with Sony.  Brock is Ineligible for the following reasons:  1.       Answered Yes to Questions 1, 5, and 17 on FDA Donor Medical History Interview Questionnaire.  a.       Question 1, In the past 12 months, have you had sex with a man who has had sex with another man in the past 5 years  b.       Questions 5, In the past 12 months, have you had sex with anyone who would answer yes to any of questions 1,2,3, or 4.  c.       Question 17, In the past 12 months have you had or been treated for syphilis, chlamydia, or gonorrhea  2.       Brock was also found to be CMV IgG Positive IgM Negative on FDA Viromed laboratory screening. This likely indicates a previous infection.  a.       CMV does not make him an Ineligible Donor, however given that you (Sony) are CMV IgG Negative we would not recommend proceeding with a CMV IgG Positive Donor.     Reviewed with pt that CMV is a common virus and that the Center for Disease Control estimates that over half of adults have been infected with CMV by age 40, most with no signs or symptoms. CMV is transmitted via body fluids (saliva, urine, blood, tears, semen, and breast milk). For this reason the \"sperm source\" needs to be assessed for CMV. Although, the risk of CMV transmission from washed sperm and/or embryos is very low. Reviewed with pt risk of Congenital CMV using a CMV IgG Positive Donor. Reviewed that babies born with CMV can have brain, liver, spleen, lung, and growth problems. The most common long-term health problem in babies born with congenital CMV infection is hearing loss.    Patient expressed understanding with the above and desire to proceed with donor intrauterine insemination. All questions answered.    Alisa Mendoza MD PGY-7  [facility] Fellow  Reviewed and approved on " 1/21/24 at 9:22 AM.

## 2025-03-02 ENCOUNTER — APPOINTMENT (OUTPATIENT)
Dept: RADIOLOGY | Facility: HOSPITAL | Age: 40
End: 2025-03-02
Payer: COMMERCIAL

## 2025-03-02 ENCOUNTER — HOSPITAL ENCOUNTER (EMERGENCY)
Facility: HOSPITAL | Age: 40
Discharge: HOME | End: 2025-03-02
Payer: COMMERCIAL

## 2025-03-02 VITALS
RESPIRATION RATE: 20 BRPM | OXYGEN SATURATION: 100 % | DIASTOLIC BLOOD PRESSURE: 82 MMHG | HEIGHT: 61 IN | BODY MASS INDEX: 24.55 KG/M2 | WEIGHT: 130 LBS | SYSTOLIC BLOOD PRESSURE: 117 MMHG | HEART RATE: 72 BPM | TEMPERATURE: 97.7 F

## 2025-03-02 DIAGNOSIS — M79.10 MYALGIA: Primary | ICD-10-CM

## 2025-03-02 DIAGNOSIS — R25.2 LEG CRAMPS: ICD-10-CM

## 2025-03-02 LAB
ALBUMIN SERPL BCP-MCNC: 4.2 G/DL (ref 3.4–5)
ALP SERPL-CCNC: 44 U/L (ref 33–110)
ALT SERPL W P-5'-P-CCNC: 13 U/L (ref 7–45)
ANION GAP SERPL CALC-SCNC: 9 MMOL/L (ref 10–20)
APTT PPP: 30 SECONDS (ref 26–36)
AST SERPL W P-5'-P-CCNC: 14 U/L (ref 9–39)
BASOPHILS # BLD AUTO: 0.03 X10*3/UL (ref 0–0.1)
BASOPHILS NFR BLD AUTO: 0.3 %
BILIRUB SERPL-MCNC: 0.3 MG/DL (ref 0–1.2)
BUN SERPL-MCNC: 16 MG/DL (ref 6–23)
CALCIUM SERPL-MCNC: 9 MG/DL (ref 8.6–10.3)
CHLORIDE SERPL-SCNC: 108 MMOL/L (ref 98–107)
CO2 SERPL-SCNC: 25 MMOL/L (ref 21–32)
CREAT SERPL-MCNC: 0.6 MG/DL (ref 0.5–1.05)
EGFRCR SERPLBLD CKD-EPI 2021: >90 ML/MIN/1.73M*2
EOSINOPHIL # BLD AUTO: 0.25 X10*3/UL (ref 0–0.7)
EOSINOPHIL NFR BLD AUTO: 2.9 %
ERYTHROCYTE [DISTWIDTH] IN BLOOD BY AUTOMATED COUNT: 12.6 % (ref 11.5–14.5)
GLUCOSE SERPL-MCNC: 91 MG/DL (ref 74–99)
HCT VFR BLD AUTO: 36.3 % (ref 36–46)
HGB BLD-MCNC: 12 G/DL (ref 12–16)
IMM GRANULOCYTES # BLD AUTO: 0.02 X10*3/UL (ref 0–0.7)
IMM GRANULOCYTES NFR BLD AUTO: 0.2 % (ref 0–0.9)
INR PPP: 1 (ref 0.9–1.1)
LYMPHOCYTES # BLD AUTO: 2.45 X10*3/UL (ref 1.2–4.8)
LYMPHOCYTES NFR BLD AUTO: 28 %
MAGNESIUM SERPL-MCNC: 1.99 MG/DL (ref 1.6–2.4)
MCH RBC QN AUTO: 28.4 PG (ref 26–34)
MCHC RBC AUTO-ENTMCNC: 33.1 G/DL (ref 32–36)
MCV RBC AUTO: 86 FL (ref 80–100)
MONOCYTES # BLD AUTO: 0.61 X10*3/UL (ref 0.1–1)
MONOCYTES NFR BLD AUTO: 7 %
NEUTROPHILS # BLD AUTO: 5.4 X10*3/UL (ref 1.2–7.7)
NEUTROPHILS NFR BLD AUTO: 61.6 %
NRBC BLD-RTO: 0 /100 WBCS (ref 0–0)
PLATELET # BLD AUTO: 345 X10*3/UL (ref 150–450)
POTASSIUM SERPL-SCNC: 3.8 MMOL/L (ref 3.5–5.3)
PROT SERPL-MCNC: 7.1 G/DL (ref 6.4–8.2)
PROTHROMBIN TIME: 11.5 SECONDS (ref 9.8–12.4)
RBC # BLD AUTO: 4.22 X10*6/UL (ref 4–5.2)
SODIUM SERPL-SCNC: 138 MMOL/L (ref 136–145)
WBC # BLD AUTO: 8.8 X10*3/UL (ref 4.4–11.3)

## 2025-03-02 PROCEDURE — 83735 ASSAY OF MAGNESIUM: CPT | Performed by: NURSE PRACTITIONER

## 2025-03-02 PROCEDURE — 85025 COMPLETE CBC W/AUTO DIFF WBC: CPT | Performed by: NURSE PRACTITIONER

## 2025-03-02 PROCEDURE — 93971 EXTREMITY STUDY: CPT | Performed by: RADIOLOGY

## 2025-03-02 PROCEDURE — 93971 EXTREMITY STUDY: CPT

## 2025-03-02 PROCEDURE — 36415 COLL VENOUS BLD VENIPUNCTURE: CPT | Performed by: NURSE PRACTITIONER

## 2025-03-02 PROCEDURE — 82310 ASSAY OF CALCIUM: CPT | Performed by: NURSE PRACTITIONER

## 2025-03-02 PROCEDURE — 80053 COMPREHEN METABOLIC PANEL: CPT | Performed by: NURSE PRACTITIONER

## 2025-03-02 PROCEDURE — 99284 EMERGENCY DEPT VISIT MOD MDM: CPT | Mod: 25

## 2025-03-02 PROCEDURE — 85610 PROTHROMBIN TIME: CPT | Performed by: NURSE PRACTITIONER

## 2025-03-02 ASSESSMENT — COLUMBIA-SUICIDE SEVERITY RATING SCALE - C-SSRS
6. HAVE YOU EVER DONE ANYTHING, STARTED TO DO ANYTHING, OR PREPARED TO DO ANYTHING TO END YOUR LIFE?: NO
2. HAVE YOU ACTUALLY HAD ANY THOUGHTS OF KILLING YOURSELF?: NO
1. IN THE PAST MONTH, HAVE YOU WISHED YOU WERE DEAD OR WISHED YOU COULD GO TO SLEEP AND NOT WAKE UP?: NO

## 2025-03-02 ASSESSMENT — PAIN - FUNCTIONAL ASSESSMENT: PAIN_FUNCTIONAL_ASSESSMENT: 0-10

## 2025-03-02 ASSESSMENT — PAIN SCALES - GENERAL: PAINLEVEL_OUTOF10: 3

## 2025-03-02 ASSESSMENT — PAIN DESCRIPTION - LOCATION: LOCATION: LEG

## 2025-03-02 ASSESSMENT — PAIN DESCRIPTION - ORIENTATION: ORIENTATION: LEFT

## 2025-03-02 NOTE — ED TRIAGE NOTES
Pt presents with the c/o LLE pain and swelling. Pt states that she had recent air travel and is concerned for a DVT and was sent here to R/O a blood clot.

## 2025-03-02 NOTE — ED PROVIDER NOTES
HPI   Chief Complaint   Patient presents with    Leg Pain     LLE pain and swelling       39-year-old female presents today with cramping and pain to the medial aspect of her left thigh.  She had a recent procedure for egg retrieval on Wednesday.  She recently flew from [place] to [place] and she is wondering if she has a DVT.  Pain extends down the medial aspect of her left thigh.  She also is endorsing cramping and tingling of her arms and fingers.  She has no history of hypokalemia or hypomagnesia.  She denies pregnancy.  She denies fever or chills.  She is not on any anticoagulant medication.  She denies history of a DVT or PE.  She denies dyspnea.  She denies chest pain.  She denies allergies to medication.      History provided by:  Patient   used: No            Patient History   Past Medical History:   Diagnosis Date    Encounter for gynecological examination (general) (routine) without abnormal findings 04/22/2022    Well woman exam    Encounter for screening for malignant neoplasm of cervix 04/22/2022    Cervical cancer screening    Other conditions influencing health status 04/22/2022    Ambivalent attitude about trying to conceive    Vitamin D deficiency, unspecified 04/25/2022    Vitamin D deficiency     History reviewed. No pertinent surgical history.  Family History   Problem Relation Name Age of Onset    Kidney disease Other Maternal     Urinary tract infection Other Maternal      Social History     Tobacco Use    Smoking status: Never    Smokeless tobacco: Never   Substance Use Topics    Alcohol use: Yes     Comment: Occassionally    Drug use: Yes     Types: Marijuana       Physical Exam   ED Triage Vitals   Temperature Heart Rate Respirations BP   03/02/25 1312 03/02/25 1312 03/02/25 1312 03/02/25 1313   36.5 °C (97.7 °F) 72 20 117/82      Pulse Ox Temp src Heart Rate Source Patient Position   03/02/25 1312 -- -- --   100 %         BP Location FiO2 (%)     -- --              Physical Exam  Constitutional:       Appearance: Normal appearance.   Cardiovascular:      Rate and Rhythm: Normal rate and regular rhythm.      Pulses: Normal pulses.      Heart sounds: Normal heart sounds.   Pulmonary:      Effort: Pulmonary effort is normal.      Breath sounds: Normal breath sounds.   Abdominal:      General: Abdomen is flat.      Palpations: Abdomen is soft.   Musculoskeletal:         General: Tenderness present.      Cervical back: Normal range of motion and neck supple.   Skin:     General: Skin is warm.      Capillary Refill: Capillary refill takes less than 2 seconds.   Neurological:      General: No focal deficit present.      Mental Status: She is alert and oriented to person, place, and time.   Psychiatric:         Mood and Affect: Mood normal.         Behavior: Behavior normal.           ED Course & MDM   Diagnoses as of 03/02/25 1443   Myalgia   Leg cramps                 No data recorded     [place] Coma Scale Score: 15 (03/02/25 1333 : Adria Rai RN)                           Medical Decision Making  Ultrasound was ordered to rule out DVT.  She had to risk factors recent procedure and recent travel on an airplane.  She had no history of DVT.  Basic labs including CMP and coags ordered.  Magnesium was checked with her history of cramping.    Metabolic panel was essentially normal.  Magnesium was normal coags were normal CBC was normal.  This lowered my concern for an electrolyte imbalance.  The ultrasound was negative for DVT and patient was ambulating under her own strength.  She can use acetaminophen and Voltaren cream to manage follow-up with PCP as needed she received information on myalgia and leg cramps.  Safely discharged home with return precautions.    Amount and/or Complexity of Data Reviewed  Labs: ordered.  Radiology: ordered and independent interpretation performed.        Procedure  Procedures     Junior Ardon, KATHIA-CNP  03/02/25 1449

## (undated) DEVICE — GOWN, SURGICAL, SMARTGOWN, XX-LARGE, STERILE

## (undated) DEVICE — TROCAR, KII OPTICAL BLADELESS 5MM Z THREAD 100MM LNGTH

## (undated) DEVICE — Device

## (undated) DEVICE — TROCAR, OPTICAL, BLADELESS, 12MM, THREADED, 100MM LENGTH

## (undated) DEVICE — IRRIGATION SET, CYSTOSCOPY, TURP, Y, CONTINUOUS, 81 IN

## (undated) DEVICE — SOLUTION, IRRIGATION, USP, SODIUM CHLORIDE 0.9%, 3000 ML

## (undated) DEVICE — SOLUTION, IRRIGATION, SODIUM CHLORIDE 0.9%, 1000 ML, POUR BOTTLE

## (undated) DEVICE — GLOVE, SURGICAL, PROTEXIS PI MICRO, 8.0, PF, LF

## (undated) DEVICE — SOLUTION, SCRUB EXIDINE, 4% CHG, 4 OZ

## (undated) DEVICE — GLOVE, SURGICAL, PROTEXIS PI BLUE W/NEUTHERA, 8.0, PF, LF

## (undated) DEVICE — GOWN, ASTOUND, L

## (undated) DEVICE — NEEDLE, INSUFFLATION, 13GAX120MM, DISP

## (undated) DEVICE — IRRIGATION SET, CYSTOSCOPY, REGULATING CLAMP, STRAIGHT, 81 IN

## (undated) DEVICE — SYRINGE, LUER LOCK, 12ML

## (undated) DEVICE — PUMP, STRYKERFLOW 2 & HANDPIECE W/10FT. IRRIGATION TUBING

## (undated) DEVICE — NEEDLE, HYPODERMIC, REGULAR WALL, SHORT BEVEL, 22 G X 1.5 IN

## (undated) DEVICE — DRAPE PACK, LAVH, W/ATTACHED LEGGINGS, W/POUCH, 100 X 114 IN, LF, STERILE

## (undated) DEVICE — SUTURE, MONOCRYL, 4-0, 27 IN, PS-2, UNDYED

## (undated) DEVICE — POSITIONING KIT, PAGAZZI, PINK PAD XL, W/ ARM AND HEAD REST

## (undated) DEVICE — CORD, MONOPOLAR, HIGH FREQUENCY, W/8MM PLUG F/VALLEYLAB, 8FT/244CM, STRL

## (undated) DEVICE — APPLICATOR, ARISTA, FLEXITIP, XL, LF

## (undated) DEVICE — BAG, PRESSURE INFUSER, 3000 CC, LF

## (undated) DEVICE — BRIEF, CURITY, XLARGE, MESH

## (undated) DEVICE — CARE KIT, LAPAROSCOPIC, ADVANCED

## (undated) DEVICE — HEMOSTAT, ARISTA, ABSORBABLE, 3 GRAMS

## (undated) DEVICE — TUBESET, HIGH FLOW II, 45 LITER PNEUMO SURE, DISP.

## (undated) DEVICE — PAD, SANITARY, OBSTETRICAL, W/ADHSV STRIP,11 IN,LF

## (undated) DEVICE — EXTENSION SET W/MALE LUER LOCK ADAPTER, VOLUME 2.4ML

## (undated) DEVICE — TRAY, FOLEY, LUBRI-SIL, 16FR, COMPLETE CARE W/STATLOCK

## (undated) DEVICE — PREP TRAY, VAGINAL